# Patient Record
Sex: FEMALE | Race: WHITE | Employment: OTHER | URBAN - METROPOLITAN AREA
[De-identification: names, ages, dates, MRNs, and addresses within clinical notes are randomized per-mention and may not be internally consistent; named-entity substitution may affect disease eponyms.]

---

## 2017-01-26 ENCOUNTER — OFFICE VISIT (OUTPATIENT)
Dept: INTERNAL MEDICINE CLINIC | Age: 57
End: 2017-01-26

## 2017-01-26 VITALS
OXYGEN SATURATION: 98 % | BODY MASS INDEX: 27.49 KG/M2 | WEIGHT: 161 LBS | SYSTOLIC BLOOD PRESSURE: 142 MMHG | HEART RATE: 81 BPM | HEIGHT: 64 IN | DIASTOLIC BLOOD PRESSURE: 100 MMHG

## 2017-01-26 DIAGNOSIS — Z11.59 NEED FOR HEPATITIS C SCREENING TEST: ICD-10-CM

## 2017-01-26 DIAGNOSIS — R22.31 AXILLARY MASS, RIGHT: Primary | ICD-10-CM

## 2017-01-26 LAB
ANION GAP SERPL CALCULATED.3IONS-SCNC: 14 MMOL/L (ref 3–16)
BUN BLDV-MCNC: 18 MG/DL (ref 7–20)
CALCIUM SERPL-MCNC: 10 MG/DL (ref 8.3–10.6)
CHLORIDE BLD-SCNC: 100 MMOL/L (ref 99–110)
CO2: 27 MMOL/L (ref 21–32)
CREAT SERPL-MCNC: 0.6 MG/DL (ref 0.6–1.1)
GFR AFRICAN AMERICAN: >60
GFR NON-AFRICAN AMERICAN: >60
GLUCOSE BLD-MCNC: 83 MG/DL (ref 70–99)
POTASSIUM SERPL-SCNC: 4.2 MMOL/L (ref 3.5–5.1)
SODIUM BLD-SCNC: 141 MMOL/L (ref 136–145)

## 2017-01-26 PROCEDURE — 99213 OFFICE O/P EST LOW 20 MIN: CPT | Performed by: INTERNAL MEDICINE

## 2017-01-26 RX ORDER — ETODOLAC 400 MG/1
400 TABLET, FILM COATED ORAL 2 TIMES DAILY
COMMUNITY
End: 2017-06-01

## 2017-01-26 ASSESSMENT — ENCOUNTER SYMPTOMS
EYES NEGATIVE: 1
RESPIRATORY NEGATIVE: 1

## 2017-01-27 LAB
HEPATITIS C ANTIBODY INTERPRETATION: NORMAL
HIV-1 AND HIV-2 ANTIBODIES: NORMAL

## 2017-02-13 ENCOUNTER — TELEPHONE (OUTPATIENT)
Dept: INTERNAL MEDICINE CLINIC | Age: 57
End: 2017-02-13

## 2017-02-13 ENCOUNTER — OFFICE VISIT (OUTPATIENT)
Dept: INTERNAL MEDICINE CLINIC | Age: 57
End: 2017-02-13

## 2017-02-13 VITALS
BODY MASS INDEX: 27.14 KG/M2 | OXYGEN SATURATION: 96 % | TEMPERATURE: 97.9 F | SYSTOLIC BLOOD PRESSURE: 130 MMHG | HEART RATE: 98 BPM | DIASTOLIC BLOOD PRESSURE: 94 MMHG | HEIGHT: 64 IN | WEIGHT: 159 LBS

## 2017-02-13 DIAGNOSIS — J40 BRONCHITIS: Primary | ICD-10-CM

## 2017-02-13 DIAGNOSIS — R07.9 CHEST PAIN, UNSPECIFIED TYPE: ICD-10-CM

## 2017-02-13 DIAGNOSIS — H92.01 EAR PAIN, RIGHT: ICD-10-CM

## 2017-02-13 PROCEDURE — 93000 ELECTROCARDIOGRAM COMPLETE: CPT | Performed by: INTERNAL MEDICINE

## 2017-02-13 PROCEDURE — 99214 OFFICE O/P EST MOD 30 MIN: CPT | Performed by: INTERNAL MEDICINE

## 2017-02-13 RX ORDER — METHYLPREDNISOLONE 4 MG/1
TABLET ORAL
Qty: 1 KIT | Refills: 0 | Status: SHIPPED | OUTPATIENT
Start: 2017-02-13 | End: 2017-03-27

## 2017-02-13 RX ORDER — CEFUROXIME AXETIL 250 MG/1
250 TABLET ORAL 2 TIMES DAILY
Qty: 20 TABLET | Refills: 0 | Status: SHIPPED | OUTPATIENT
Start: 2017-02-13 | End: 2017-02-23

## 2017-02-13 ASSESSMENT — ENCOUNTER SYMPTOMS
EYES NEGATIVE: 1
CHEST TIGHTNESS: 1

## 2017-02-14 ENCOUNTER — OFFICE VISIT (OUTPATIENT)
Dept: SURGERY | Age: 57
End: 2017-02-14

## 2017-02-14 VITALS
DIASTOLIC BLOOD PRESSURE: 88 MMHG | BODY MASS INDEX: 27.14 KG/M2 | SYSTOLIC BLOOD PRESSURE: 136 MMHG | WEIGHT: 159 LBS | HEIGHT: 64 IN

## 2017-02-14 DIAGNOSIS — R22.31 AXILLARY MASS, RIGHT: Primary | ICD-10-CM

## 2017-02-14 PROCEDURE — 99243 OFF/OP CNSLTJ NEW/EST LOW 30: CPT | Performed by: SURGERY

## 2017-02-14 ASSESSMENT — ENCOUNTER SYMPTOMS
ABDOMINAL DISTENTION: 0
BLOOD IN STOOL: 0
BACK PAIN: 0
ABDOMINAL PAIN: 0
NAUSEA: 0
RECTAL PAIN: 0
CONSTIPATION: 0
COUGH: 0
DIARRHEA: 0
SHORTNESS OF BREATH: 0
COLOR CHANGE: 0
VOMITING: 0
TROUBLE SWALLOWING: 0

## 2017-02-27 ENCOUNTER — HOSPITAL ENCOUNTER (OUTPATIENT)
Dept: ULTRASOUND IMAGING | Age: 57
Discharge: HOME | End: 2017-02-27
Attending: SURGERY | Admitting: SURGERY

## 2017-02-27 DIAGNOSIS — R22.31 AXILLARY MASS, RIGHT: ICD-10-CM

## 2017-02-27 DIAGNOSIS — R22.31 LOCALIZED SWELLING, MASS AND LUMP, RIGHT UPPER LIMB: ICD-10-CM

## 2017-03-27 ENCOUNTER — OFFICE VISIT (OUTPATIENT)
Dept: INTERNAL MEDICINE CLINIC | Age: 57
End: 2017-03-27

## 2017-03-27 VITALS
HEART RATE: 74 BPM | DIASTOLIC BLOOD PRESSURE: 82 MMHG | BODY MASS INDEX: 27.31 KG/M2 | WEIGHT: 160 LBS | HEIGHT: 64 IN | SYSTOLIC BLOOD PRESSURE: 132 MMHG | OXYGEN SATURATION: 98 %

## 2017-03-27 DIAGNOSIS — I10 ESSENTIAL HYPERTENSION: ICD-10-CM

## 2017-03-27 DIAGNOSIS — R07.82 INTERCOSTAL PAIN: ICD-10-CM

## 2017-03-27 PROCEDURE — 99213 OFFICE O/P EST LOW 20 MIN: CPT | Performed by: INTERNAL MEDICINE

## 2017-03-27 ASSESSMENT — ENCOUNTER SYMPTOMS
RESPIRATORY NEGATIVE: 1
EYES NEGATIVE: 1

## 2017-03-28 ENCOUNTER — HOSPITAL ENCOUNTER (OUTPATIENT)
Dept: OTHER | Age: 57
Discharge: HOME | End: 2017-03-28
Attending: INTERNAL MEDICINE | Admitting: INTERNAL MEDICINE

## 2017-03-28 DIAGNOSIS — R07.82 INTERCOSTAL PAIN: ICD-10-CM

## 2017-05-01 ENCOUNTER — OFFICE VISIT (OUTPATIENT)
Dept: ENT CLINIC | Age: 57
End: 2017-05-01

## 2017-05-01 VITALS
DIASTOLIC BLOOD PRESSURE: 86 MMHG | HEART RATE: 72 BPM | HEIGHT: 64 IN | WEIGHT: 160 LBS | SYSTOLIC BLOOD PRESSURE: 135 MMHG | BODY MASS INDEX: 27.31 KG/M2

## 2017-05-01 DIAGNOSIS — H91.93 HEARING LOSS OF BOTH EARS: Primary | ICD-10-CM

## 2017-05-01 DIAGNOSIS — H66.11 CHRONIC TUBOTYMPANIC SUPPURATIVE OTITIS MEDIA OF RIGHT EAR: ICD-10-CM

## 2017-05-01 PROCEDURE — 99203 OFFICE O/P NEW LOW 30 MIN: CPT | Performed by: OTOLARYNGOLOGY

## 2017-05-03 DIAGNOSIS — Q89.8 STICKLER SYNDROME: ICD-10-CM

## 2017-05-03 DIAGNOSIS — Z79.899 ENCOUNTER FOR LONG-TERM (CURRENT) USE OF MEDICATIONS: ICD-10-CM

## 2017-05-04 RX ORDER — ETODOLAC 400 MG/1
TABLET, FILM COATED ORAL
Qty: 180 TABLET | Refills: 0 | Status: SHIPPED | OUTPATIENT
Start: 2017-05-04 | End: 2017-06-01

## 2017-05-11 ENCOUNTER — OFFICE VISIT (OUTPATIENT)
Dept: ENT CLINIC | Age: 57
End: 2017-05-11

## 2017-05-11 DIAGNOSIS — H90.8 MIXED HEARING LOSS: Primary | ICD-10-CM

## 2017-05-11 PROCEDURE — 92557 COMPREHENSIVE HEARING TEST: CPT | Performed by: AUDIOLOGIST

## 2017-05-11 PROCEDURE — 92550 TYMPANOMETRY & REFLEX THRESH: CPT | Performed by: AUDIOLOGIST

## 2017-05-23 ENCOUNTER — EMPLOYEE WELLNESS (OUTPATIENT)
Dept: OTHER | Age: 57
End: 2017-05-23

## 2017-05-23 LAB
CHOLESTEROL, TOTAL: 216 MG/DL (ref 0–199)
GLUCOSE BLD-MCNC: 71 MG/DL (ref 70–99)
HDLC SERPL-MCNC: 57 MG/DL (ref 40–60)
LDL CHOLESTEROL CALCULATED: 142 MG/DL
TRIGL SERPL-MCNC: 84 MG/DL (ref 0–150)

## 2017-06-01 ENCOUNTER — OFFICE VISIT (OUTPATIENT)
Dept: RHEUMATOLOGY | Age: 57
End: 2017-06-01

## 2017-06-01 VITALS
DIASTOLIC BLOOD PRESSURE: 84 MMHG | HEIGHT: 64 IN | BODY MASS INDEX: 27.55 KG/M2 | WEIGHT: 161.4 LBS | SYSTOLIC BLOOD PRESSURE: 138 MMHG | HEART RATE: 84 BPM

## 2017-06-01 DIAGNOSIS — Z79.1 ENCOUNTER FOR LONG-TERM (CURRENT) USE OF NON-STEROIDAL ANTI-INFLAMMATORIES: ICD-10-CM

## 2017-06-01 DIAGNOSIS — M15.9 PRIMARY OSTEOARTHRITIS INVOLVING MULTIPLE JOINTS: Primary | ICD-10-CM

## 2017-06-01 LAB
ALBUMIN SERPL-MCNC: 4.3 G/DL (ref 3.4–5)
ALP BLD-CCNC: 61 U/L (ref 40–129)
ALT SERPL-CCNC: 12 U/L (ref 10–40)
AST SERPL-CCNC: 14 U/L (ref 15–37)
BASOPHILS ABSOLUTE: 0 K/UL (ref 0–0.2)
BASOPHILS RELATIVE PERCENT: 0.5 %
BILIRUB SERPL-MCNC: 0.4 MG/DL (ref 0–1)
BILIRUBIN DIRECT: <0.2 MG/DL (ref 0–0.3)
BILIRUBIN, INDIRECT: ABNORMAL MG/DL (ref 0–1)
CREAT SERPL-MCNC: 0.7 MG/DL (ref 0.6–1.1)
EOSINOPHILS ABSOLUTE: 0.1 K/UL (ref 0–0.6)
EOSINOPHILS RELATIVE PERCENT: 1 %
GFR AFRICAN AMERICAN: >60
GFR NON-AFRICAN AMERICAN: >60
HCT VFR BLD CALC: 39.8 % (ref 36–48)
HEMOGLOBIN: 13.5 G/DL (ref 12–16)
LYMPHOCYTES ABSOLUTE: 1.7 K/UL (ref 1–5.1)
LYMPHOCYTES RELATIVE PERCENT: 28 %
MCH RBC QN AUTO: 30.6 PG (ref 26–34)
MCHC RBC AUTO-ENTMCNC: 33.8 G/DL (ref 31–36)
MCV RBC AUTO: 90.4 FL (ref 80–100)
MONOCYTES ABSOLUTE: 0.4 K/UL (ref 0–1.3)
MONOCYTES RELATIVE PERCENT: 6.3 %
NEUTROPHILS ABSOLUTE: 4 K/UL (ref 1.7–7.7)
NEUTROPHILS RELATIVE PERCENT: 64.2 %
PDW BLD-RTO: 13.1 % (ref 12.4–15.4)
PLATELET # BLD: 230 K/UL (ref 135–450)
PMV BLD AUTO: 10 FL (ref 5–10.5)
RBC # BLD: 4.4 M/UL (ref 4–5.2)
TOTAL PROTEIN: 6.6 G/DL (ref 6.4–8.2)
WBC # BLD: 6.2 K/UL (ref 4–11)

## 2017-06-01 PROCEDURE — 99213 OFFICE O/P EST LOW 20 MIN: CPT | Performed by: INTERNAL MEDICINE

## 2017-06-01 RX ORDER — MELOXICAM 15 MG/1
15 TABLET ORAL DAILY
Qty: 90 TABLET | Refills: 1 | Status: SHIPPED | OUTPATIENT
Start: 2017-06-01 | End: 2017-11-13 | Stop reason: ALTCHOICE

## 2017-06-01 RX ORDER — MELOXICAM 15 MG/1
15 TABLET ORAL DAILY
COMMUNITY
End: 2017-06-01 | Stop reason: SDUPTHER

## 2017-06-12 ENCOUNTER — TELEPHONE (OUTPATIENT)
Dept: INTERNAL MEDICINE CLINIC | Age: 57
End: 2017-06-12

## 2017-06-12 ENCOUNTER — OFFICE VISIT (OUTPATIENT)
Dept: ENT CLINIC | Age: 57
End: 2017-06-12

## 2017-06-12 VITALS — HEART RATE: 71 BPM | DIASTOLIC BLOOD PRESSURE: 88 MMHG | SYSTOLIC BLOOD PRESSURE: 123 MMHG

## 2017-06-12 DIAGNOSIS — H90.8 MIXED HEARING LOSS: Primary | ICD-10-CM

## 2017-06-12 DIAGNOSIS — H93.13 SUBJECTIVE TINNITUS OF BOTH EARS: ICD-10-CM

## 2017-06-12 DIAGNOSIS — H90.A22 SENSORINEURAL HEARING LOSS OF LEFT EAR WITH RESTRICTED HEARING OF RIGHT EAR: ICD-10-CM

## 2017-06-12 PROCEDURE — 99214 OFFICE O/P EST MOD 30 MIN: CPT | Performed by: OTOLARYNGOLOGY

## 2017-06-29 ENCOUNTER — TELEPHONE (OUTPATIENT)
Dept: ORTHOPEDIC SURGERY | Age: 57
End: 2017-06-29

## 2017-06-29 ENCOUNTER — OFFICE VISIT (OUTPATIENT)
Dept: ORTHOPEDIC SURGERY | Age: 57
End: 2017-06-29

## 2017-06-29 VITALS
BODY MASS INDEX: 28.53 KG/M2 | HEART RATE: 62 BPM | WEIGHT: 161 LBS | DIASTOLIC BLOOD PRESSURE: 83 MMHG | SYSTOLIC BLOOD PRESSURE: 135 MMHG | HEIGHT: 63 IN | TEMPERATURE: 98 F

## 2017-06-29 DIAGNOSIS — Z96.652 HISTORY OF TOTAL LEFT KNEE REPLACEMENT: Primary | ICD-10-CM

## 2017-06-29 DIAGNOSIS — M25.552 PAIN OF LEFT HIP JOINT: ICD-10-CM

## 2017-06-29 PROCEDURE — 99213 OFFICE O/P EST LOW 20 MIN: CPT | Performed by: ORTHOPAEDIC SURGERY

## 2017-06-29 PROCEDURE — 73560 X-RAY EXAM OF KNEE 1 OR 2: CPT | Performed by: ORTHOPAEDIC SURGERY

## 2017-09-06 DIAGNOSIS — Q89.8 STICKLER SYNDROME: ICD-10-CM

## 2017-09-06 DIAGNOSIS — Z79.899 ENCOUNTER FOR LONG-TERM (CURRENT) USE OF MEDICATIONS: ICD-10-CM

## 2017-09-06 RX ORDER — ETODOLAC 400 MG/1
TABLET, FILM COATED ORAL
Qty: 180 TABLET | Refills: 0 | Status: SHIPPED | OUTPATIENT
Start: 2017-09-06 | End: 2017-11-27

## 2017-10-04 ENCOUNTER — TELEPHONE (OUTPATIENT)
Dept: INTERNAL MEDICINE CLINIC | Age: 57
End: 2017-10-04

## 2017-10-04 NOTE — TELEPHONE ENCOUNTER
Patient going on a cruise and asking for script for motion sickness patches    [id number] [facility], [address] [place] [address] 28 - F [phone]

## 2017-10-05 RX ORDER — SCOLOPAMINE TRANSDERMAL SYSTEM 1 MG/1
1 PATCH, EXTENDED RELEASE TRANSDERMAL
Qty: 10 PATCH | Refills: 1 | Status: SHIPPED | OUTPATIENT
Start: 2017-10-05 | End: 2017-11-13 | Stop reason: ALTCHOICE

## 2017-11-13 ENCOUNTER — OFFICE VISIT (OUTPATIENT)
Dept: INTERNAL MEDICINE CLINIC | Age: 57
End: 2017-11-13

## 2017-11-13 ENCOUNTER — TELEPHONE (OUTPATIENT)
Dept: INTERNAL MEDICINE CLINIC | Age: 57
End: 2017-11-13

## 2017-11-13 VITALS
HEART RATE: 81 BPM | OXYGEN SATURATION: 98 % | BODY MASS INDEX: 28.17 KG/M2 | SYSTOLIC BLOOD PRESSURE: 134 MMHG | DIASTOLIC BLOOD PRESSURE: 98 MMHG | HEIGHT: 63 IN | WEIGHT: 159 LBS

## 2017-11-13 DIAGNOSIS — I10 ESSENTIAL HYPERTENSION: ICD-10-CM

## 2017-11-13 DIAGNOSIS — K52.9 GASTROENTERITIS: Primary | ICD-10-CM

## 2017-11-13 LAB
A/G RATIO: 1.6 (ref 1.1–2.2)
ALBUMIN SERPL-MCNC: 4.2 G/DL (ref 3.4–5)
ALP BLD-CCNC: 69 U/L (ref 40–129)
ALT SERPL-CCNC: 18 U/L (ref 10–40)
ANION GAP SERPL CALCULATED.3IONS-SCNC: 13 MMOL/L (ref 3–16)
AST SERPL-CCNC: 20 U/L (ref 15–37)
BASOPHILS ABSOLUTE: 0 K/UL (ref 0–0.2)
BASOPHILS RELATIVE PERCENT: 0.3 %
BILIRUB SERPL-MCNC: 0.4 MG/DL (ref 0–1)
BUN BLDV-MCNC: 16 MG/DL (ref 7–20)
CALCIUM SERPL-MCNC: 9.5 MG/DL (ref 8.3–10.6)
CHLORIDE BLD-SCNC: 99 MMOL/L (ref 99–110)
CO2: 28 MMOL/L (ref 21–32)
CREAT SERPL-MCNC: 0.5 MG/DL (ref 0.6–1.1)
EOSINOPHILS ABSOLUTE: 0.1 K/UL (ref 0–0.6)
EOSINOPHILS RELATIVE PERCENT: 1.2 %
GFR AFRICAN AMERICAN: >60
GFR NON-AFRICAN AMERICAN: >60
GLOBULIN: 2.6 G/DL
GLUCOSE BLD-MCNC: 97 MG/DL (ref 70–99)
HCT VFR BLD CALC: 42.4 % (ref 36–48)
HEMOGLOBIN: 14.7 G/DL (ref 12–16)
LYMPHOCYTES ABSOLUTE: 1 K/UL (ref 1–5.1)
LYMPHOCYTES RELATIVE PERCENT: 19.9 %
MCH RBC QN AUTO: 30.8 PG (ref 26–34)
MCHC RBC AUTO-ENTMCNC: 34.6 G/DL (ref 31–36)
MCV RBC AUTO: 89.1 FL (ref 80–100)
MONOCYTES ABSOLUTE: 0.5 K/UL (ref 0–1.3)
MONOCYTES RELATIVE PERCENT: 10.5 %
NEUTROPHILS ABSOLUTE: 3.4 K/UL (ref 1.7–7.7)
NEUTROPHILS RELATIVE PERCENT: 68.1 %
PDW BLD-RTO: 12.5 % (ref 12.4–15.4)
PLATELET # BLD: 237 K/UL (ref 135–450)
PMV BLD AUTO: 9.3 FL (ref 5–10.5)
POTASSIUM SERPL-SCNC: 3.9 MMOL/L (ref 3.5–5.1)
RBC # BLD: 4.76 M/UL (ref 4–5.2)
SODIUM BLD-SCNC: 140 MMOL/L (ref 136–145)
TOTAL PROTEIN: 6.8 G/DL (ref 6.4–8.2)
WBC # BLD: 5 K/UL (ref 4–11)

## 2017-11-13 PROCEDURE — 99213 OFFICE O/P EST LOW 20 MIN: CPT | Performed by: INTERNAL MEDICINE

## 2017-11-13 RX ORDER — PROMETHAZINE HYDROCHLORIDE 25 MG/1
25 TABLET ORAL EVERY 8 HOURS PRN
Qty: 12 TABLET | Refills: 0 | Status: SHIPPED | OUTPATIENT
Start: 2017-11-13 | End: 2017-11-20

## 2017-11-13 ASSESSMENT — ENCOUNTER SYMPTOMS
EYES NEGATIVE: 1
VOMITING: 1
DIARRHEA: 1
RESPIRATORY NEGATIVE: 1

## 2017-11-13 NOTE — PROGRESS NOTES
Subjective:      Patient ID: Miller Neville is a 56 y.o. female. Was on 8 day   Cruise     Got back 11/5    [facility]    She started with vomiting/diarrhea    11/11/17     had it  3 days prior     Had flu shots    No fevers  Chills    Some mild HA    No resp   Symptoms    She feels better than yesterday    Nausea and diarrhea    Keeps pedialite down    Few myalgia    No blood    Diarrhea and vomiting stooped yesterday  HPI    Review of Systems   Constitutional: Negative. HENT: Negative. Eyes: Negative. Respiratory: Negative. Cardiovascular: Negative. Gastrointestinal: Positive for diarrhea and vomiting. Endocrine: Negative. Genitourinary: Negative. Objective:   Physical Exam   Constitutional: She appears well-developed and well-nourished. HENT:   Head: Normocephalic and atraumatic. Eyes: Conjunctivae and EOM are normal.   Cardiovascular: Regular rhythm. Pulmonary/Chest: Breath sounds normal. No respiratory distress. She has no wheezes. She has no rales. Abdominal: Soft. She exhibits no distension and no mass. There is no tenderness. There is no rebound. Assessment:      Nancy Chaudhry was seen today for nausea & vomiting. Diagnoses and all orders for this visit:    Gastroenteritis  -     promethazine (PHENERGAN) 25 MG tablet; Take 1 tablet by mouth every 8 hours as needed for Nausea  -     CBC Auto Differential  -     Comprehensive Metabolic Panel         Started 1 week after ,  has it  She is better  No fever or blood    Says she    Stopped diarrhea and vomiting    Essential hypertension     stable          Plan: Raymundo Carmichael

## 2017-11-15 ENCOUNTER — TELEPHONE (OUTPATIENT)
Dept: INTERNAL MEDICINE CLINIC | Age: 57
End: 2017-11-15

## 2017-11-15 DIAGNOSIS — R19.7 DIARRHEA, UNSPECIFIED TYPE: Primary | ICD-10-CM

## 2017-11-15 RX ORDER — METRONIDAZOLE 250 MG/1
250 TABLET ORAL 4 TIMES DAILY
Qty: 20 TABLET | Refills: 0 | Status: SHIPPED | OUTPATIENT
Start: 2017-11-15 | End: 2017-11-20

## 2017-11-15 NOTE — TELEPHONE ENCOUNTER
Patient states she saw Dr Devin Schneider for diarrhea and vomiting. She said she still has diarrhea, cramping, abdominal pain, she said her stomach pain is so bad Roni Stamford can feel it running through her colon\". She thinks she has giardia and wants flagyl. She has bloating, watery stool, cramping. She was in [place] and came home 11/5 and was swimming with dolphins.  This has been ongoing since Saturday     Pharmacy:  [address] [place] [address] 180 - P [phone] - F [phone]      AS#[phone]

## 2017-11-27 ENCOUNTER — OFFICE VISIT (OUTPATIENT)
Dept: RHEUMATOLOGY | Age: 57
End: 2017-11-27

## 2017-11-27 VITALS
DIASTOLIC BLOOD PRESSURE: 84 MMHG | WEIGHT: 158.25 LBS | BODY MASS INDEX: 28.04 KG/M2 | SYSTOLIC BLOOD PRESSURE: 128 MMHG | HEIGHT: 63 IN | HEART RATE: 72 BPM

## 2017-11-27 DIAGNOSIS — Q89.8 STICKLER SYNDROME: ICD-10-CM

## 2017-11-27 DIAGNOSIS — Z79.899 ENCOUNTER FOR LONG-TERM (CURRENT) USE OF MEDICATIONS: ICD-10-CM

## 2017-11-27 PROCEDURE — 99213 OFFICE O/P EST LOW 20 MIN: CPT | Performed by: INTERNAL MEDICINE

## 2017-11-27 RX ORDER — ASCORBIC ACID 500 MG
500 TABLET ORAL DAILY
COMMUNITY
End: 2020-10-12

## 2017-11-27 RX ORDER — ETODOLAC 500 MG/1
500 TABLET, FILM COATED ORAL 2 TIMES DAILY
Qty: 60 TABLET | Refills: 2 | Status: SHIPPED | OUTPATIENT
Start: 2017-11-27 | End: 2018-05-31 | Stop reason: SDUPTHER

## 2017-11-27 RX ORDER — ETODOLAC 400 MG/1
TABLET, FILM COATED ORAL
Qty: 180 TABLET | Refills: 0 | Status: CANCELLED | OUTPATIENT
Start: 2017-11-27

## 2017-11-27 RX ORDER — GLUCOSAMINE SULFATE DIPOT CHLR 1000 MG
500 TABLET ORAL
COMMUNITY
End: 2019-01-22

## 2017-11-27 RX ORDER — ETODOLAC 500 MG/1
500 TABLET, FILM COATED ORAL 2 TIMES DAILY
COMMUNITY
End: 2017-11-27 | Stop reason: SDUPTHER

## 2017-11-27 RX ORDER — GLUCOSAM/CHON-MSM1/C/MANG/BOSW 750-644 MG
TABLET ORAL
COMMUNITY

## 2017-11-27 NOTE — PROGRESS NOTES
Subjective:      Patient ID: Panda Kennedy is a 56 y.o. female. HPI   the patient returns for follow-up of osteoarthritis. Switching the Mobic was of no benefit she went back to Lodine. She started on multiple supplements which seems to be helping except she still has pain in her hands and wrists. Review of Systems denies redness or warmth; no definite symptoms carpal tunnel syndrome  Prior to Visit Medications    Medication Sig Taking? Authorizing Provider   Elizabeth Zingiber officinalis, (ELIZABETH ROOT PO) Take by mouth Yes Historical Provider, MD   Misc Natural Products (OSTEO BI-FLEX ADV TRIPLE ST) TABS Take by mouth Yes Historical Provider, MD   Methylsulfonylmethane (MSM) 1000 MG TABS Take 500 mg by mouth Yes Historical Provider, MD   vitamin C (ASCORBIC ACID) 500 MG tablet Take 500 mg by mouth daily Yes Historical Provider, MD   etodolac (LODINE) 500 MG tablet Take 1 tablet by mouth 2 times daily Yes Peace Sidhu MD   Cholecalciferol (VITAMIN D) 2000 UNITS CAPS capsule Take  by mouth. Historical Provider, MD           Objective:   Physical Exam /84   Pulse 72   Ht 5' 3\" (1.6 m)   Wt 158 lb 4 oz (71.8 kg)   LMP 12/01/2011   BMI 28.03 kg/m²   Decreased external rotation left greater than right hip. Prominent osteoarthritic changes PIPs and DIPs. No definite swelling wrists. Tinel sign negative bilaterally    Assessment:      Osteoarthritis      Plan:      The patient will increase the dose of Lodine 500 mg twice daily. Blood work recently obtained by her primary care physician was reviewed. The patient will call the office in one month's time to let me know how she's doing and I'll see her back in 6 weeks' time to monitor for renal insufficiency.   She was advised to use acetaminophen up thousand milligrams 3 times daily for extra analgesia

## 2018-03-20 VITALS — BODY MASS INDEX: 27.12 KG/M2 | WEIGHT: 158 LBS

## 2018-05-22 ENCOUNTER — EMPLOYEE WELLNESS (OUTPATIENT)
Dept: OTHER | Age: 58
End: 2018-05-22

## 2018-05-22 LAB
CHOLESTEROL, TOTAL: 238 MG/DL (ref 0–199)
GLUCOSE BLD-MCNC: 80 MG/DL (ref 70–99)
HDLC SERPL-MCNC: 58 MG/DL (ref 40–60)
LDL CHOLESTEROL CALCULATED: 153 MG/DL
TRIGL SERPL-MCNC: 134 MG/DL (ref 0–150)

## 2018-05-25 ENCOUNTER — OFFICE VISIT (OUTPATIENT)
Dept: ORTHOPEDIC SURGERY | Age: 58
End: 2018-05-25

## 2018-05-25 VITALS
BODY MASS INDEX: 28.17 KG/M2 | DIASTOLIC BLOOD PRESSURE: 91 MMHG | WEIGHT: 165 LBS | RESPIRATION RATE: 16 BRPM | HEIGHT: 64 IN | SYSTOLIC BLOOD PRESSURE: 151 MMHG | HEART RATE: 64 BPM

## 2018-05-25 DIAGNOSIS — M25.531 RIGHT WRIST PAIN: Primary | ICD-10-CM

## 2018-05-25 DIAGNOSIS — S63.91XA SPRAIN OF RIGHT HAND, INITIAL ENCOUNTER: ICD-10-CM

## 2018-05-25 PROCEDURE — L3908 WHO COCK-UP NONMOLDE PRE OTS: HCPCS | Performed by: ORTHOPAEDIC SURGERY

## 2018-05-25 PROCEDURE — 99213 OFFICE O/P EST LOW 20 MIN: CPT | Performed by: ORTHOPAEDIC SURGERY

## 2018-05-25 RX ORDER — NAPROXEN 500 MG/1
500 TABLET ORAL 2 TIMES DAILY WITH MEALS
Qty: 60 TABLET | Refills: 0 | Status: CANCELLED | OUTPATIENT
Start: 2018-05-25

## 2018-05-29 VITALS — BODY MASS INDEX: 28.87 KG/M2 | WEIGHT: 163 LBS

## 2018-05-31 ENCOUNTER — OFFICE VISIT (OUTPATIENT)
Dept: RHEUMATOLOGY | Age: 58
End: 2018-05-31

## 2018-05-31 ENCOUNTER — HOSPITAL ENCOUNTER (OUTPATIENT)
Dept: WOMENS IMAGING | Age: 58
Discharge: HOME | End: 2018-05-31
Attending: OBSTETRICS & GYNECOLOGY | Admitting: OBSTETRICS & GYNECOLOGY

## 2018-05-31 VITALS
DIASTOLIC BLOOD PRESSURE: 78 MMHG | SYSTOLIC BLOOD PRESSURE: 118 MMHG | HEART RATE: 68 BPM | BODY MASS INDEX: 27.84 KG/M2 | WEIGHT: 162.2 LBS

## 2018-05-31 DIAGNOSIS — Z12.31 VISIT FOR SCREENING MAMMOGRAM: ICD-10-CM

## 2018-05-31 DIAGNOSIS — Q89.8 STICKLER SYNDROME: Primary | ICD-10-CM

## 2018-05-31 DIAGNOSIS — Z79.1 ENCOUNTER FOR LONG-TERM (CURRENT) USE OF NON-STEROIDAL ANTI-INFLAMMATORIES: ICD-10-CM

## 2018-05-31 LAB
ALBUMIN SERPL-MCNC: 4.9 G/DL (ref 3.4–5)
ALP BLD-CCNC: 92 U/L (ref 40–129)
ALT SERPL-CCNC: 16 U/L (ref 10–40)
AST SERPL-CCNC: 19 U/L (ref 15–37)
BASOPHILS ABSOLUTE: 0 K/UL (ref 0–0.2)
BASOPHILS RELATIVE PERCENT: 0.6 %
BILIRUB SERPL-MCNC: 0.6 MG/DL (ref 0–1)
BILIRUBIN DIRECT: <0.2 MG/DL (ref 0–0.3)
BILIRUBIN, INDIRECT: NORMAL MG/DL (ref 0–1)
CREAT SERPL-MCNC: 0.6 MG/DL (ref 0.6–1.1)
EOSINOPHILS ABSOLUTE: 0.1 K/UL (ref 0–0.6)
EOSINOPHILS RELATIVE PERCENT: 1.9 %
GFR AFRICAN AMERICAN: >60
GFR NON-AFRICAN AMERICAN: >60
HCT VFR BLD CALC: 43.1 % (ref 36–48)
HEMOGLOBIN: 15.1 G/DL (ref 12–16)
LYMPHOCYTES ABSOLUTE: 2.1 K/UL (ref 1–5.1)
LYMPHOCYTES RELATIVE PERCENT: 30.5 %
MCH RBC QN AUTO: 30.8 PG (ref 26–34)
MCHC RBC AUTO-ENTMCNC: 35.1 G/DL (ref 31–36)
MCV RBC AUTO: 87.6 FL (ref 80–100)
MONOCYTES ABSOLUTE: 0.4 K/UL (ref 0–1.3)
MONOCYTES RELATIVE PERCENT: 5.2 %
NEUTROPHILS ABSOLUTE: 4.2 K/UL (ref 1.7–7.7)
NEUTROPHILS RELATIVE PERCENT: 61.8 %
PDW BLD-RTO: 12.9 % (ref 12.4–15.4)
PLATELET # BLD: 254 K/UL (ref 135–450)
PMV BLD AUTO: 9.7 FL (ref 5–10.5)
RBC # BLD: 4.92 M/UL (ref 4–5.2)
TOTAL PROTEIN: 7.5 G/DL (ref 6.4–8.2)
WBC # BLD: 6.8 K/UL (ref 4–11)

## 2018-05-31 PROCEDURE — 99213 OFFICE O/P EST LOW 20 MIN: CPT | Performed by: INTERNAL MEDICINE

## 2018-05-31 RX ORDER — ETODOLAC 500 MG/1
500 TABLET, FILM COATED ORAL 2 TIMES DAILY
Qty: 180 TABLET | Refills: 1 | Status: SHIPPED | OUTPATIENT
Start: 2018-05-31 | End: 2018-11-19 | Stop reason: SDUPTHER

## 2018-06-04 ENCOUNTER — OFFICE VISIT (OUTPATIENT)
Dept: ORTHOPEDIC SURGERY | Age: 58
End: 2018-06-04

## 2018-06-04 VITALS — RESPIRATION RATE: 16 BRPM | WEIGHT: 154 LBS | BODY MASS INDEX: 25.66 KG/M2 | HEIGHT: 65 IN

## 2018-06-04 DIAGNOSIS — M65.331 TRIGGER MIDDLE FINGER OF RIGHT HAND: Primary | ICD-10-CM

## 2018-06-04 PROCEDURE — 99243 OFF/OP CNSLTJ NEW/EST LOW 30: CPT | Performed by: ORTHOPAEDIC SURGERY

## 2018-06-05 ENCOUNTER — TELEPHONE (OUTPATIENT)
Dept: ORTHOPEDIC SURGERY | Age: 58
End: 2018-06-05

## 2018-06-06 ENCOUNTER — TELEPHONE (OUTPATIENT)
Dept: ORTHOPEDIC SURGERY | Age: 58
End: 2018-06-06

## 2018-06-08 ENCOUNTER — TELEPHONE (OUTPATIENT)
Dept: ORTHOPEDIC SURGERY | Age: 58
End: 2018-06-08

## 2018-06-12 ENCOUNTER — TELEPHONE (OUTPATIENT)
Dept: ORTHOPEDIC SURGERY | Age: 58
End: 2018-06-12

## 2018-08-13 ENCOUNTER — TELEPHONE (OUTPATIENT)
Dept: ENT CLINIC | Age: 58
End: 2018-08-13

## 2018-08-13 ENCOUNTER — OFFICE VISIT (OUTPATIENT)
Dept: ENT CLINIC | Age: 58
End: 2018-08-13

## 2018-08-13 VITALS
HEART RATE: 69 BPM | HEIGHT: 65 IN | SYSTOLIC BLOOD PRESSURE: 120 MMHG | WEIGHT: 163 LBS | BODY MASS INDEX: 27.16 KG/M2 | DIASTOLIC BLOOD PRESSURE: 83 MMHG

## 2018-08-13 DIAGNOSIS — H92.01 OTALGIA, RIGHT EAR: Primary | ICD-10-CM

## 2018-08-13 PROCEDURE — 92504 EAR MICROSCOPY EXAMINATION: CPT | Performed by: OTOLARYNGOLOGY

## 2018-08-13 PROCEDURE — 99214 OFFICE O/P EST MOD 30 MIN: CPT | Performed by: OTOLARYNGOLOGY

## 2018-08-13 NOTE — PATIENT INSTRUCTIONS
1. You may proceed with your right ear hearing aid fitting tomorrow. 2. You may use acetaminophen (eg. Tylenol) or Ibuprofen (eg. Advil) OTC as needed for pain.

## 2018-09-13 ENCOUNTER — HOSPITAL ENCOUNTER (OUTPATIENT)
Dept: ULTRASOUND IMAGING | Age: 58
Discharge: HOME | End: 2018-09-13
Attending: INTERNAL MEDICINE | Admitting: INTERNAL MEDICINE

## 2018-09-13 ENCOUNTER — OFFICE VISIT (OUTPATIENT)
Dept: INTERNAL MEDICINE CLINIC | Age: 58
End: 2018-09-13

## 2018-09-13 VITALS
SYSTOLIC BLOOD PRESSURE: 122 MMHG | HEART RATE: 82 BPM | DIASTOLIC BLOOD PRESSURE: 88 MMHG | OXYGEN SATURATION: 97 % | BODY MASS INDEX: 26.7 KG/M2 | TEMPERATURE: 98.1 F | WEIGHT: 158 LBS

## 2018-09-13 DIAGNOSIS — R07.81 PLEURITIC PAIN: ICD-10-CM

## 2018-09-13 DIAGNOSIS — R07.82 INTERCOSTAL PAIN: ICD-10-CM

## 2018-09-13 DIAGNOSIS — I10 ESSENTIAL HYPERTENSION: ICD-10-CM

## 2018-09-13 DIAGNOSIS — R10.12 LEFT UPPER QUADRANT PAIN: ICD-10-CM

## 2018-09-13 DIAGNOSIS — R10.12 LUQ ABDOMINAL PAIN: Primary | ICD-10-CM

## 2018-09-13 DIAGNOSIS — R10.12 LUQ ABDOMINAL PAIN: ICD-10-CM

## 2018-09-13 PROCEDURE — 99214 OFFICE O/P EST MOD 30 MIN: CPT | Performed by: INTERNAL MEDICINE

## 2018-09-13 ASSESSMENT — ENCOUNTER SYMPTOMS
SORE THROAT: 0
ABDOMINAL PAIN: 0
DIARRHEA: 0
SHORTNESS OF BREATH: 0
TROUBLE SWALLOWING: 0
CHEST TIGHTNESS: 1
VOMITING: 0
WHEEZING: 0
NAUSEA: 0

## 2018-09-13 NOTE — PROGRESS NOTES
Department of Internal Medicine  Clinic Note    Date: 9/13/2018                                               Subjective/Objective:     Chief Complaint   Patient presents with    Chest Pain     c/o pain in lt lateral lower rib area for 2-3 days. Hurts to take a deep breath. No trauma     HPI: Pt presents today for evaluation of acute onset left lower rib pain. She states the pain is worse when she takes a deep breath. She has not taken anything to alleviate the pain. She has had a similar experience a couple months ago which resolved on its own. At that time she did not have the pleuritic pain that is present today. Neg GERD, Indigestion   Last BM was yesterday, soft, negative blood. Patient Active Problem List    Diagnosis Date Noted    Otalgia, right ear 08/13/2018    Trigger middle finger of right hand 06/04/2018    Mixed hearing loss 06/12/2017    Sensorineural hearing loss (SNHL) of left ear with restricted hearing of right ear 06/12/2017    Subjective tinnitus of both ears 06/12/2017    Hearing loss of both ears 05/01/2017    Chronic tubotympanic suppurative otitis media of right ear 05/01/2017    Intercostal pain 03/27/2017    Essential hypertension 03/27/2017    Stickler syndrome 12/11/2014    Joint pain 12/11/2014     Social History:   TOBACCO:   reports that she has never smoked. She has never used smokeless tobacco.     ETOH:   reports that she does not drink alcohol.     Past Medical History:   Diagnosis Date    Arthritis     lt knee/hands    Right knee pain     Stickler syndrome        Past Surgical History:   Procedure Laterality Date    CLEFT PALATE REPAIR  1964 and 1971    DILATION AND CURETTAGE OF UTERUS  12/9/11    HYSTEROSCOPY,NOVASURE ABLATION    MANDIBLE SURGERY  1990    OVARY REMOVAL      and cyst    TOTAL KNEE ARTHROPLASTY  3/25/11    Left     Office Visit on 05/31/2018   Component Date Value Ref Range Status    WBC 05/31/2018 6.8  4.0 - 11.0 K/uL Final    RBC 05/31/2018 4.92  4.00 - 5.20 M/uL Final    Hemoglobin 05/31/2018 15.1  12.0 - 16.0 g/dL Final    Hematocrit 05/31/2018 43.1  36.0 - 48.0 % Final    MCV 05/31/2018 87.6  80.0 - 100.0 fL Final    MCH 05/31/2018 30.8  26.0 - 34.0 pg Final    MCHC 05/31/2018 35.1  31.0 - 36.0 g/dL Final    RDW 05/31/2018 12.9  12.4 - 15.4 % Final    Platelets 05/57/4777 254  135 - 450 K/uL Final    MPV 05/31/2018 9.7  5.0 - 10.5 fL Final    Neutrophils % 05/31/2018 61.8  % Final    Lymphocytes % 05/31/2018 30.5  % Final    Monocytes % 05/31/2018 5.2  % Final    Eosinophils % 05/31/2018 1.9  % Final    Basophils % 05/31/2018 0.6  % Final    Neutrophils # 05/31/2018 4.2  1.7 - 7.7 K/uL Final    Lymphocytes # 05/31/2018 2.1  1.0 - 5.1 K/uL Final    Monocytes # 05/31/2018 0.4  0.0 - 1.3 K/uL Final    Eosinophils # 05/31/2018 0.1  0.0 - 0.6 K/uL Final    Basophils # 05/31/2018 0.0  0.0 - 0.2 K/uL Final    Total Protein 05/31/2018 7.5  6.4 - 8.2 g/dL Final    Alb 05/31/2018 4.9  3.4 - 5.0 g/dL Final    Alkaline Phosphatase 05/31/2018 92  40 - 129 U/L Final    ALT 05/31/2018 16  10 - 40 U/L Final    AST 05/31/2018 19  15 - 37 U/L Final    Total Bilirubin 05/31/2018 0.6  0.0 - 1.0 mg/dL Final    Bilirubin, Direct 05/31/2018 <0.2  0.0 - 0.3 mg/dL Final    Bilirubin, Indirect 05/31/2018 see below  0.0 - 1.0 mg/dL Final    Comment: Indirect Bilirubin cannot be calculated since Total Bilirubin  and/or Direct Bilirubin is below measurable range.  CREATININE 05/31/2018 0.6  0.6 - 1.1 mg/dL Final    GFR Non- 05/31/2018 >60  >60 Final    Comment: >60 mL/min/1.73m2 EGFR, calc. for ages 25 and older using the  MDRD formula (not corrected for weight), is valid for stable  renal function.  GFR  05/31/2018 >60  >60 Final    Comment: Chronic Kidney Disease: less than 60 ml/min/1.73 sq.m. Kidney Failure: less than 15 ml/min/1.73 sq.m.   Results valid for patients 18 years and unintentional computerized transcription errors may be present.

## 2018-09-14 ENCOUNTER — TELEPHONE (OUTPATIENT)
Dept: INTERNAL MEDICINE CLINIC | Age: 58
End: 2018-09-14

## 2018-09-14 NOTE — TELEPHONE ENCOUNTER
Please contact the patient and inform her that her abdominal ultrasound was within normal limits and was not concerning for pathology. Thank you.

## 2018-09-14 NOTE — TELEPHONE ENCOUNTER
Patient states that she is still having severe pain on her left side. It hurts to lay, breath, sleep. What do you recommend? I did advise the patient that Dr. Caity Martin would be back on Monday and we would wait to see what he recommend. I did advise her to go to the ER if the pain worsens.

## 2018-10-15 ENCOUNTER — OFFICE VISIT (OUTPATIENT)
Dept: ORTHOPEDIC SURGERY | Age: 58
End: 2018-10-15
Payer: COMMERCIAL

## 2018-10-15 VITALS
RESPIRATION RATE: 16 BRPM | WEIGHT: 158 LBS | SYSTOLIC BLOOD PRESSURE: 114 MMHG | TEMPERATURE: 98.8 F | BODY MASS INDEX: 26.33 KG/M2 | DIASTOLIC BLOOD PRESSURE: 80 MMHG | HEIGHT: 65 IN | HEART RATE: 76 BPM

## 2018-10-15 DIAGNOSIS — Z96.652 S/P TOTAL KNEE ARTHROPLASTY, LEFT: Primary | ICD-10-CM

## 2018-10-15 PROCEDURE — 99212 OFFICE O/P EST SF 10 MIN: CPT | Performed by: PHYSICIAN ASSISTANT

## 2018-10-15 NOTE — PROGRESS NOTES
Objective:     She is alert, oriented x 3, pleasant, well nourished, developed and in no acute distress. /80   Pulse 76   Temp 98.8 °F (37.1 °C) (Tympanic)   Resp 16   Ht 5' 4.5\" (1.638 m)   Wt 158 lb (71.7 kg)   LMP 12/01/2011   BMI 26.70 kg/m²      KNEE EXAM:  Examination of the left knee shows: There is  no obvious deformity. There is not erythema. There is minimal to no soft tissue swelling. There is no significant joint effusion. AROM-  Extension 0                       Flexion  125  There is no pain associated with ROM testing. Medial joint line is not tender to palpation. Lateral joint line is not tender to palpation. There is not crepitus along the joint line with ROM testing. There is no significant instability with varus or valgus stress testing. Anterior Drawer test is negative for instability. Extensor Mechanism is  intact. X Rays: performed in the office today:   AP, Lateral and Sunrise of the left Knee: There is a left total knee arthroplasty present. The alignment is satisfactory. There are no signs of significant failure or loosening. Assessment:       ICD-10-CM    1. S/P total knee arthroplasty, left 3.25.11 Z96.652 XR KNEE LEFT (3 VIEWS)        Plan:     The natural history of the patient's diagnosis as well as the treatment options were discussed in full and questions were answered. Risks and benefits of the treatment options also reviewed in detail. Continue with a HEP-  A home exercise program was re-instructed today including ROM exercises and strengthening exercises. The patient verbalized understanding of these exercises as well as the importance of the exercise program to promote return of normal function. If pain intensifies or other problems arise you are to notify the office. Prophylactic antibiotics for any surgical or dental procedures. This is recommended for lifetime.     Follow up yearly with x ray and clinical evaluations. Call or return to clinic prn if these symptoms worsen or fail to improve as anticipated.

## 2018-11-19 ENCOUNTER — OFFICE VISIT (OUTPATIENT)
Dept: RHEUMATOLOGY | Age: 58
End: 2018-11-19
Payer: COMMERCIAL

## 2018-11-19 VITALS
HEART RATE: 72 BPM | HEIGHT: 65 IN | SYSTOLIC BLOOD PRESSURE: 132 MMHG | WEIGHT: 160.25 LBS | DIASTOLIC BLOOD PRESSURE: 84 MMHG | BODY MASS INDEX: 26.7 KG/M2

## 2018-11-19 DIAGNOSIS — Z79.1 ENCOUNTER FOR LONG-TERM (CURRENT) USE OF NSAIDS: ICD-10-CM

## 2018-11-19 DIAGNOSIS — Q89.8 STICKLER SYNDROME: Primary | ICD-10-CM

## 2018-11-19 LAB
ALBUMIN SERPL-MCNC: 4.4 G/DL (ref 3.4–5)
ALP BLD-CCNC: 85 U/L (ref 40–129)
ALT SERPL-CCNC: 15 U/L (ref 10–40)
AST SERPL-CCNC: 17 U/L (ref 15–37)
BASOPHILS ABSOLUTE: 0 K/UL (ref 0–0.2)
BASOPHILS RELATIVE PERCENT: 0.5 %
BILIRUB SERPL-MCNC: 0.6 MG/DL (ref 0–1)
BILIRUBIN DIRECT: <0.2 MG/DL (ref 0–0.3)
BILIRUBIN, INDIRECT: NORMAL MG/DL (ref 0–1)
CREAT SERPL-MCNC: 0.7 MG/DL (ref 0.6–1.1)
EOSINOPHILS ABSOLUTE: 0.1 K/UL (ref 0–0.6)
EOSINOPHILS RELATIVE PERCENT: 1.6 %
GFR AFRICAN AMERICAN: >60
GFR NON-AFRICAN AMERICAN: >60
HCT VFR BLD CALC: 40.7 % (ref 36–48)
HEMOGLOBIN: 13.9 G/DL (ref 12–16)
LYMPHOCYTES ABSOLUTE: 2 K/UL (ref 1–5.1)
LYMPHOCYTES RELATIVE PERCENT: 32.2 %
MCH RBC QN AUTO: 30.3 PG (ref 26–34)
MCHC RBC AUTO-ENTMCNC: 34.2 G/DL (ref 31–36)
MCV RBC AUTO: 88.7 FL (ref 80–100)
MONOCYTES ABSOLUTE: 0.4 K/UL (ref 0–1.3)
MONOCYTES RELATIVE PERCENT: 6 %
NEUTROPHILS ABSOLUTE: 3.7 K/UL (ref 1.7–7.7)
NEUTROPHILS RELATIVE PERCENT: 59.7 %
PDW BLD-RTO: 12.7 % (ref 12.4–15.4)
PLATELET # BLD: 248 K/UL (ref 135–450)
PMV BLD AUTO: 9.5 FL (ref 5–10.5)
RBC # BLD: 4.59 M/UL (ref 4–5.2)
TOTAL PROTEIN: 6.9 G/DL (ref 6.4–8.2)
WBC # BLD: 6.1 K/UL (ref 4–11)

## 2018-11-19 PROCEDURE — 99213 OFFICE O/P EST LOW 20 MIN: CPT | Performed by: INTERNAL MEDICINE

## 2018-11-19 RX ORDER — ETODOLAC 500 MG/1
500 TABLET, FILM COATED ORAL 2 TIMES DAILY
Qty: 180 TABLET | Refills: 1 | Status: SHIPPED | OUTPATIENT
Start: 2018-11-19 | End: 2019-04-22 | Stop reason: SDUPTHER

## 2019-04-22 ENCOUNTER — OFFICE VISIT (OUTPATIENT)
Dept: RHEUMATOLOGY | Age: 59
End: 2019-04-22
Payer: COMMERCIAL

## 2019-04-22 VITALS
BODY MASS INDEX: 26.01 KG/M2 | HEIGHT: 65 IN | SYSTOLIC BLOOD PRESSURE: 122 MMHG | DIASTOLIC BLOOD PRESSURE: 82 MMHG | HEART RATE: 64 BPM | WEIGHT: 156.13 LBS

## 2019-04-22 DIAGNOSIS — Z79.1 ENCOUNTER FOR LONG-TERM (CURRENT) USE OF NON-STEROIDAL ANTI-INFLAMMATORIES: ICD-10-CM

## 2019-04-22 DIAGNOSIS — Q89.8 STICKLER SYNDROME: Primary | ICD-10-CM

## 2019-04-22 LAB
ALBUMIN SERPL-MCNC: 4.6 G/DL (ref 3.4–5)
ALP BLD-CCNC: 91 U/L (ref 40–129)
ALT SERPL-CCNC: 15 U/L (ref 10–40)
AST SERPL-CCNC: 19 U/L (ref 15–37)
BASOPHILS ABSOLUTE: 0 K/UL (ref 0–0.2)
BASOPHILS RELATIVE PERCENT: 0.7 %
BILIRUB SERPL-MCNC: 0.5 MG/DL (ref 0–1)
BILIRUBIN DIRECT: <0.2 MG/DL (ref 0–0.3)
BILIRUBIN, INDIRECT: NORMAL MG/DL (ref 0–1)
CREAT SERPL-MCNC: 0.6 MG/DL (ref 0.6–1.1)
EOSINOPHILS ABSOLUTE: 0.1 K/UL (ref 0–0.6)
EOSINOPHILS RELATIVE PERCENT: 1.6 %
GFR AFRICAN AMERICAN: >60
GFR NON-AFRICAN AMERICAN: >60
HCT VFR BLD CALC: 42.6 % (ref 36–48)
HEMOGLOBIN: 14.6 G/DL (ref 12–16)
LYMPHOCYTES ABSOLUTE: 1.8 K/UL (ref 1–5.1)
LYMPHOCYTES RELATIVE PERCENT: 28.3 %
MCH RBC QN AUTO: 31 PG (ref 26–34)
MCHC RBC AUTO-ENTMCNC: 34.4 G/DL (ref 31–36)
MCV RBC AUTO: 90.3 FL (ref 80–100)
MONOCYTES ABSOLUTE: 0.3 K/UL (ref 0–1.3)
MONOCYTES RELATIVE PERCENT: 5.1 %
NEUTROPHILS ABSOLUTE: 4.1 K/UL (ref 1.7–7.7)
NEUTROPHILS RELATIVE PERCENT: 64.3 %
PDW BLD-RTO: 13.2 % (ref 12.4–15.4)
PLATELET # BLD: 245 K/UL (ref 135–450)
PMV BLD AUTO: 9.5 FL (ref 5–10.5)
RBC # BLD: 4.72 M/UL (ref 4–5.2)
TOTAL PROTEIN: 7.3 G/DL (ref 6.4–8.2)
WBC # BLD: 6.3 K/UL (ref 4–11)

## 2019-04-22 PROCEDURE — 99213 OFFICE O/P EST LOW 20 MIN: CPT | Performed by: INTERNAL MEDICINE

## 2019-04-22 RX ORDER — ETODOLAC 500 MG/1
500 TABLET, FILM COATED ORAL 2 TIMES DAILY
Qty: 180 TABLET | Refills: 1 | Status: SHIPPED | OUTPATIENT
Start: 2019-04-22 | End: 2020-03-02

## 2019-04-22 NOTE — PROGRESS NOTES
SUBJECTIVE:    Patient ID: Megan Langford is a 66 y.o. female. Patient returns for follow-up of Stickler syndrome. She's currently on 500 mg twice daily and has intermittent flares of her disease suggestive of fibromyalgia. She has difficulty with her ADLs. She wakens twice a night. Review of Systems:    Respiratory: denies cough, denies shortness of breath  Gastrointestinal: denies abdominal pain, denies nausea  Musculoskeletal:                  no       Morning stiffness  Ears, nose, mouth: denies mucosal sores  Skin: denies rash, denies alopecia    Prior to Visit Medications    Medication Sig Taking? Authorizing Provider   Misc Natural Products (OSTEO BI-FLEX ADV TRIPLE ST) TABS Take by mouth Yes Historical Provider, MD   vitamin C (ASCORBIC ACID) 500 MG tablet Take 500 mg by mouth daily Yes Historical Provider, MD   etodolac (LODINE) 500 MG tablet Take 1 tablet by mouth 2 times daily Yes Geo Sellers MD        OBJECTIVE:  /82   Pulse 64   Ht 5' 4.5\" (1.638 m)   Wt 156 lb 2 oz (70.8 kg)   LMP 12/01/2011   BMI 26.38 kg/m²      Physical Exam:    General: the patient demonstrated normal gait and posture without evidence of overt muscle wasting. Hygiene appears normal    Ears, mouth, nose, throat: no mucosal sores      Respiratory: assessment of the patient's respiratory effort showed no evidence of abnormal respiration and no use of accessory muscles. Diaphragmatic movement is normal.  Percussion of the patient's chest showed no evidence of dullness flatness or hyperresonance. Auscultation of the patient's lungs reveal normal breath sounds in all lung fields. There is no evidence of abnormal sounds such as rales or wheezes. There is no evidence of friction rub. Cardiovascular: palpation of the patient's chest revealed no abnormal thrill. The point of maximal impulse showed no displacement.   Auscultation of the heart revealed no evidence of murmur gallop or abnormal heart sound.    Musculoskeletal: Osteoarthritic changes DIPs and PIPs tender points occiput and trapezius areas tender points lateral epicondyles tender points trochanteric region  Skin: no rashes    ASSESSMENT: stickler syndrome. Possible fibromyalgia        PLAN:   Blood work will be obtained today to monitor for adverse drug reactions. Laboratory studies from last visit were reviewed. She was encouraged to use Tylenol up to 3000 mg a day along with her Lodine. She was given literature on fibromyalgia to review she was instructed to call the office in 3 weeks' time if agreeable at that point we'll start Cymbalta. I'll see her back in 5 months time.

## 2019-04-25 ENCOUNTER — TELEPHONE (OUTPATIENT)
Dept: INTERNAL MEDICINE CLINIC | Age: 59
End: 2019-04-25

## 2019-04-25 NOTE — TELEPHONE ENCOUNTER
Pt call and states that she want to start on low dose Cymbalta. He request to send to [facility] mail order pharmacy. She request call and and left message.

## 2019-04-26 RX ORDER — DULOXETIN HYDROCHLORIDE 20 MG/1
20 CAPSULE, DELAYED RELEASE ORAL DAILY
Qty: 90 CAPSULE | Refills: 0 | Status: SHIPPED | OUTPATIENT
Start: 2019-04-26 | End: 2019-07-14 | Stop reason: SDUPTHER

## 2019-05-14 ENCOUNTER — EMPLOYEE WELLNESS (OUTPATIENT)
Dept: OTHER | Age: 59
End: 2019-05-14

## 2019-05-14 LAB
CHOLESTEROL, TOTAL: 227 MG/DL (ref 0–199)
GLUCOSE BLD-MCNC: 79 MG/DL (ref 70–99)
HDLC SERPL-MCNC: 51 MG/DL (ref 40–60)
LDL CHOLESTEROL CALCULATED: 138 MG/DL
TRIGL SERPL-MCNC: 191 MG/DL (ref 0–150)

## 2019-06-03 VITALS — WEIGHT: 144 LBS | BODY MASS INDEX: 24.34 KG/M2

## 2019-06-05 ENCOUNTER — TELEPHONE (OUTPATIENT)
Dept: ORTHOPEDIC SURGERY | Age: 59
End: 2019-06-05

## 2019-06-05 NOTE — TELEPHONE ENCOUNTER
Called and spoke with patient. No fever or chills. Offered appointment tomorrow with Dr. Gagandeep Garcia, not sure she can get off of work. She is to call us if anything changes or worsens.

## 2019-06-06 ENCOUNTER — OFFICE VISIT (OUTPATIENT)
Dept: ORTHOPEDIC SURGERY | Age: 59
End: 2019-06-06
Payer: COMMERCIAL

## 2019-06-06 VITALS
BODY MASS INDEX: 23.99 KG/M2 | TEMPERATURE: 98.9 F | WEIGHT: 144 LBS | HEIGHT: 65 IN | SYSTOLIC BLOOD PRESSURE: 142 MMHG | DIASTOLIC BLOOD PRESSURE: 100 MMHG | HEART RATE: 73 BPM

## 2019-06-06 DIAGNOSIS — M25.462 EFFUSION OF LEFT KNEE: ICD-10-CM

## 2019-06-06 DIAGNOSIS — Z96.652 S/P TOTAL KNEE ARTHROPLASTY, LEFT: Primary | ICD-10-CM

## 2019-06-06 PROCEDURE — 20610 DRAIN/INJ JOINT/BURSA W/O US: CPT | Performed by: PHYSICIAN ASSISTANT

## 2019-06-06 PROCEDURE — 99213 OFFICE O/P EST LOW 20 MIN: CPT | Performed by: PHYSICIAN ASSISTANT

## 2019-06-06 NOTE — PROGRESS NOTES
Subjective:      Patient ID: Rodríguez Almanzar is a 66 y.o.  female. Chief Complaint   Patient presents with    Knee Pain     Left knee        HPI: She is here for follow-up on her left knee. She has a history of a left knee arthroplasty performed by Dr. Johnson Early on 3/25/2011. She had been doing very well with her knee. She developed left knee pain and swelling after a recent dental cleaning. She did not take prophylactic antibiotics at the time of her cleaning. She has developed left knee pain as well as swelling of the left knee joint. Pain is 5/10. Review of Systems:   Negative for fever or chills. Negative for numbness or tingling in the lower extremity.     Past Medical History:   Diagnosis Date    Arthritis     lt knee/hands    Right knee pain     Stickler syndrome        Family History   Problem Relation Age of Onset    Arthritis Mother     Heart Disease Father     Hypertension Father     Cancer Maternal Grandmother        Past Surgical History:   Procedure Laterality Date    CLEFT PALATE REPAIR  1964 and 1971    DILATION AND CURETTAGE OF UTERUS  12/9/11    HYSTEROSCOPY,NOVASURE ABLATION    MANDIBLE SURGERY  1990    OVARY REMOVAL      and cyst    TOTAL KNEE ARTHROPLASTY  3/25/11    Left       Social History     Occupational History    Occupation: Pharmacy, IV technician   Tobacco Use    Smoking status: Never Smoker    Smokeless tobacco: Never Used   Substance and Sexual Activity    Alcohol use: No    Drug use: No    Sexual activity: Yes     Partners: Male       Current Outpatient Medications   Medication Sig Dispense Refill    DULoxetine (CYMBALTA) 20 MG extended release capsule Take 1 capsule by mouth daily 90 capsule 0    etodolac (LODINE) 500 MG tablet Take 1 tablet by mouth 2 times daily 180 tablet 1    Misc Natural Products (OSTEO BI-FLEX ADV TRIPLE ST) TABS Take by mouth      vitamin C (ASCORBIC ACID) 500 MG tablet Take 500 mg by mouth daily       No current facility-administered medications for this visit. Objective:   She is alert, oriented x 3, pleasant, well nourished, developed and in no acute distress. BP (!) 142/100   Pulse 73   Temp 98.9 °F (37.2 °C) (Temporal)   Ht 5' 4.5\" (1.638 m)   Wt 144 lb (65.3 kg)   LMP 12/01/2011   BMI 24.34 kg/m²      Examination of the left knee: The alignment of the knee is neutral.   There is not erythema. There no soft tissue swelling. There is mild to moderate effusion. ROM-  Extension 0          -   Flexion  125   There mild pain associated with ROM testing. Medial joint line is not tender to palpation. Lateral joint line is not tender to palpation. Retro patellar crepitus is not present. Patellar Compression testing does not produce pain. Extensor Mechanism is  intact. Examination of the lower extremities are intact with sensation to light touch. Motor testing  5/5 in all major motor groups of the lower extremities. Gait is normal heel to toe. Gait is antalgic. Negative Javed's Sign. SLR negative. Examination of the lower extremities shows intact perfusion to all extremities. No cyanosis. Digits are warm to touch, capillary refill is less than 2 seconds. There is no edema noted. Examination of the skin over both lower extremities reveals: The skin to be intact without lacerations or abrasions. No significant erythema. No rashes or skin lesions. X Rays: performed in the office today:   AP Standing, Lateral and Sunrise  Left Knee: There is a left cemented total knee arthroplasty present. The alignment is satisfactory. There are no signs of failure or loosening. Jada moderate effusion noted in the suprapatellar pouch. Diagnosis:        ICD-10-CM    1. S/P total knee arthroplasty, left 3.25.11 Z96.652 XR KNEE LEFT (3 VIEWS)   2. Effusion of left knee M25.462         Assessment/ Plan:      Clinically stable left knee arthroplasty.   Increased left knee pain and left knee joint effusion status post recent dental cleansing without prophylactic antibiotics. The natural history of the patient's diagnosis as well as the treatment options were discussed in full and questions were answered. Risks and benefits of the treatment options also reviewed in detail. The patient's history and clinical findings where discussed with Dr Jamil Casiano. Dr Jamil Casiano did have face to face time with the patient. All questions where answered. Discussed the need for aspiration of the knee joint for pain relief as well as diagnostic purposes. All risks (infection,neuro-vascular injury, pain) and benefits were discussed as well as questions answered. Verbal consent was obtained. Knee Aspiration                                                    PROCEDURE NOTE:     Pre op Diagnosis:  left knee effusion. Post op Diagnosis: Same  With her permission, the left knee was prepped in standard sterile fashion with  Alcohol and betadine over the lateral superior aspect of the knee. Using an 18 gauge needle through the anterior lateral supra patellar approach 6 cc of cloudy fluid was aspirated. The patient tolerated this well without difficulty. A band-aid and a compression dressing was applied. She was advised to ice the knee for 15-20 minutes to relieve any related pain. The synovial fluid from her left knee will be sent off for Synovasure Testing. Rest, Ice, Compression and Elevation  OTC NSAID'S discussed to be taken in appropriate  therapeutic doses. Activity restriction/ Modification discussed. The patient was advised that NSAID-type medications have two very important potential side effects: gastrointestinal irritation including hemorrhage and renal injuries. She was asked to take the medication with food and to stop if she experiences any GI upset. I asked her to call for vomiting, abdominal pain or black/bloody stools.  He should have renal function testing per his medical provider periodically. The patient expresses understanding of these issues and questions were answered. She was provided a note to be off work for the next 3-4 days. Follow Up: 1 week. Call or return to clinic prn if these symptoms worsen or fail to improve as anticipated.

## 2019-06-11 ENCOUNTER — TELEPHONE (OUTPATIENT)
Dept: ORTHOPEDIC SURGERY | Age: 59
End: 2019-06-11

## 2019-06-11 NOTE — TELEPHONE ENCOUNTER
Pt is calling to see if we got the results from her knee.  He had drain it  Please leave a message on home phone

## 2019-07-15 RX ORDER — DULOXETIN HYDROCHLORIDE 20 MG/1
CAPSULE, DELAYED RELEASE ORAL
Qty: 90 CAPSULE | Refills: 0 | Status: SHIPPED | OUTPATIENT
Start: 2019-07-15 | End: 2019-10-07 | Stop reason: SDUPTHER

## 2019-09-23 ENCOUNTER — OFFICE VISIT (OUTPATIENT)
Dept: RHEUMATOLOGY | Age: 59
End: 2019-09-23
Payer: COMMERCIAL

## 2019-09-23 VITALS
WEIGHT: 154.2 LBS | HEART RATE: 66 BPM | DIASTOLIC BLOOD PRESSURE: 82 MMHG | BODY MASS INDEX: 26.06 KG/M2 | SYSTOLIC BLOOD PRESSURE: 126 MMHG

## 2019-09-23 DIAGNOSIS — Z79.1 ENCOUNTER FOR LONG-TERM (CURRENT) USE OF NON-STEROIDAL ANTI-INFLAMMATORIES: ICD-10-CM

## 2019-09-23 DIAGNOSIS — Q89.8 STICKLER SYNDROME: Primary | ICD-10-CM

## 2019-09-23 LAB
ALBUMIN SERPL-MCNC: 4.5 G/DL (ref 3.4–5)
ALP BLD-CCNC: 79 U/L (ref 40–129)
ALT SERPL-CCNC: 14 U/L (ref 10–40)
AST SERPL-CCNC: 18 U/L (ref 15–37)
BASOPHILS ABSOLUTE: 0 K/UL (ref 0–0.2)
BASOPHILS RELATIVE PERCENT: 0.8 %
BILIRUB SERPL-MCNC: 0.4 MG/DL (ref 0–1)
BILIRUBIN DIRECT: <0.2 MG/DL (ref 0–0.3)
BILIRUBIN, INDIRECT: NORMAL MG/DL (ref 0–1)
CREAT SERPL-MCNC: 0.7 MG/DL (ref 0.6–1.1)
EOSINOPHILS ABSOLUTE: 0.1 K/UL (ref 0–0.6)
EOSINOPHILS RELATIVE PERCENT: 2.3 %
GFR AFRICAN AMERICAN: >60
GFR NON-AFRICAN AMERICAN: >60
HCT VFR BLD CALC: 39.1 % (ref 36–48)
HEMOGLOBIN: 13.5 G/DL (ref 12–16)
LYMPHOCYTES ABSOLUTE: 1.6 K/UL (ref 1–5.1)
LYMPHOCYTES RELATIVE PERCENT: 29.1 %
MCH RBC QN AUTO: 31.1 PG (ref 26–34)
MCHC RBC AUTO-ENTMCNC: 34.5 G/DL (ref 31–36)
MCV RBC AUTO: 90.3 FL (ref 80–100)
MONOCYTES ABSOLUTE: 0.4 K/UL (ref 0–1.3)
MONOCYTES RELATIVE PERCENT: 6.7 %
NEUTROPHILS ABSOLUTE: 3.3 K/UL (ref 1.7–7.7)
NEUTROPHILS RELATIVE PERCENT: 61.1 %
PDW BLD-RTO: 12.6 % (ref 12.4–15.4)
PLATELET # BLD: 234 K/UL (ref 135–450)
PMV BLD AUTO: 10 FL (ref 5–10.5)
RBC # BLD: 4.33 M/UL (ref 4–5.2)
TOTAL PROTEIN: 6.6 G/DL (ref 6.4–8.2)
WBC # BLD: 5.3 K/UL (ref 4–11)

## 2019-09-23 PROCEDURE — 99213 OFFICE O/P EST LOW 20 MIN: CPT | Performed by: INTERNAL MEDICINE

## 2019-09-23 NOTE — PROGRESS NOTES
Subjective:      Patient ID: Dara Flowers is a 66 y.o. female. HPI  The patient returns for follow-up of Stickler syndrome. She is feeling better since beginning Cymbalta 20 mg daily along with Lodine 500 mg twice a day. Her only new complaint is intermittent numbness of the second third and fourth digits at the tips involving the right hand  Review of Systems  Denies abdominal pain denies nausea intermittent elbow discomfort  Objective:   Physical Exam  /82 (Site: Left Upper Arm, Position: Sitting, Cuff Size: Medium Adult)   Pulse 66   Wt 154 lb 3.2 oz (69.9 kg)   LMP 12/01/2011   BMI 26.06 kg/m²   Alert female in no acute distress prominent osteoarthritic changes DIPs PIPs Tinel sign negative on the right   Assessment:      Stickler syndrome      Plan:      Blood work will be obtained today to monitor for adverse drug reactions. Laboratory studies from last.  I will see patient back in 5 months time.         Denise Dumont MD

## 2019-10-07 RX ORDER — DULOXETIN HYDROCHLORIDE 20 MG/1
CAPSULE, DELAYED RELEASE ORAL
Qty: 90 CAPSULE | Refills: 1 | Status: SHIPPED | OUTPATIENT
Start: 2019-10-07 | End: 2020-03-02 | Stop reason: SDUPTHER

## 2019-10-14 RX ORDER — CEPHALEXIN 500 MG/1
CAPSULE ORAL
Qty: 20 CAPSULE | Refills: 1 | Status: SHIPPED | OUTPATIENT
Start: 2019-10-14 | End: 2020-03-02

## 2019-12-06 ENCOUNTER — HOSPITAL ENCOUNTER (OUTPATIENT)
Dept: WOMENS IMAGING | Age: 59
Discharge: HOME | End: 2019-12-06
Payer: COMMERCIAL

## 2019-12-06 DIAGNOSIS — Z12.31 VISIT FOR SCREENING MAMMOGRAM: ICD-10-CM

## 2019-12-06 PROCEDURE — 77067 SCR MAMMO BI INCL CAD: CPT

## 2019-12-06 PROCEDURE — 77063 BREAST TOMOSYNTHESIS BI: CPT

## 2020-03-02 ENCOUNTER — OFFICE VISIT (OUTPATIENT)
Dept: RHEUMATOLOGY | Age: 60
End: 2020-03-02
Payer: COMMERCIAL

## 2020-03-02 VITALS
DIASTOLIC BLOOD PRESSURE: 80 MMHG | HEIGHT: 65 IN | WEIGHT: 156.4 LBS | BODY MASS INDEX: 26.06 KG/M2 | SYSTOLIC BLOOD PRESSURE: 128 MMHG | HEART RATE: 68 BPM

## 2020-03-02 LAB
ALBUMIN SERPL-MCNC: 4.3 G/DL (ref 3.4–5)
ALP BLD-CCNC: 99 U/L (ref 40–129)
ALT SERPL-CCNC: 14 U/L (ref 10–40)
AST SERPL-CCNC: 17 U/L (ref 15–37)
BASOPHILS ABSOLUTE: 0 K/UL (ref 0–0.2)
BASOPHILS RELATIVE PERCENT: 0.5 %
BILIRUB SERPL-MCNC: 0.5 MG/DL (ref 0–1)
BILIRUBIN DIRECT: <0.2 MG/DL (ref 0–0.3)
BILIRUBIN, INDIRECT: NORMAL MG/DL (ref 0–1)
CREAT SERPL-MCNC: 0.7 MG/DL (ref 0.6–1.1)
EOSINOPHILS ABSOLUTE: 0.1 K/UL (ref 0–0.6)
EOSINOPHILS RELATIVE PERCENT: 1.3 %
GFR AFRICAN AMERICAN: >60
GFR NON-AFRICAN AMERICAN: >60
HCT VFR BLD CALC: 40.4 % (ref 36–48)
HEMOGLOBIN: 14.1 G/DL (ref 12–16)
LYMPHOCYTES ABSOLUTE: 1.8 K/UL (ref 1–5.1)
LYMPHOCYTES RELATIVE PERCENT: 22.8 %
MCH RBC QN AUTO: 30.8 PG (ref 26–34)
MCHC RBC AUTO-ENTMCNC: 34.9 G/DL (ref 31–36)
MCV RBC AUTO: 88.4 FL (ref 80–100)
MONOCYTES ABSOLUTE: 0.4 K/UL (ref 0–1.3)
MONOCYTES RELATIVE PERCENT: 4.9 %
NEUTROPHILS ABSOLUTE: 5.5 K/UL (ref 1.7–7.7)
NEUTROPHILS RELATIVE PERCENT: 70.5 %
PDW BLD-RTO: 12.6 % (ref 12.4–15.4)
PLATELET # BLD: 264 K/UL (ref 135–450)
PMV BLD AUTO: 9 FL (ref 5–10.5)
RBC # BLD: 4.57 M/UL (ref 4–5.2)
TOTAL PROTEIN: 7.1 G/DL (ref 6.4–8.2)
WBC # BLD: 7.7 K/UL (ref 4–11)

## 2020-03-02 PROCEDURE — 99213 OFFICE O/P EST LOW 20 MIN: CPT | Performed by: INTERNAL MEDICINE

## 2020-03-02 RX ORDER — ETODOLAC 400 MG/1
400 TABLET, FILM COATED ORAL 2 TIMES DAILY
Qty: 180 TABLET | Refills: 1 | Status: SHIPPED | OUTPATIENT
Start: 2020-03-02 | End: 2020-10-12 | Stop reason: SDUPTHER

## 2020-03-02 RX ORDER — DULOXETIN HYDROCHLORIDE 20 MG/1
CAPSULE, DELAYED RELEASE ORAL
Qty: 90 CAPSULE | Refills: 1 | Status: SHIPPED | OUTPATIENT
Start: 2020-03-02 | End: 2020-10-12 | Stop reason: SDUPTHER

## 2020-03-02 RX ORDER — ETODOLAC 500 MG/1
500 TABLET, FILM COATED ORAL 2 TIMES DAILY
Qty: 180 TABLET | Refills: 1 | Status: CANCELLED | OUTPATIENT
Start: 2020-03-02

## 2020-03-02 RX ORDER — ETODOLAC 400 MG/1
400 TABLET, FILM COATED ORAL 2 TIMES DAILY
COMMUNITY
End: 2020-03-02 | Stop reason: SDUPTHER

## 2020-03-13 ENCOUNTER — HOSPITAL ENCOUNTER (OUTPATIENT)
Dept: GENERAL RADIOLOGY | Age: 60
Discharge: HOME | End: 2020-03-13
Payer: COMMERCIAL

## 2020-03-13 ENCOUNTER — HOSPITAL ENCOUNTER (OUTPATIENT)
Age: 60
Discharge: HOME | End: 2020-03-13
Payer: COMMERCIAL

## 2020-03-13 PROCEDURE — 71046 X-RAY EXAM CHEST 2 VIEWS: CPT

## 2020-10-12 ENCOUNTER — OFFICE VISIT (OUTPATIENT)
Dept: RHEUMATOLOGY | Age: 60
End: 2020-10-12
Payer: COMMERCIAL

## 2020-10-12 VITALS
HEART RATE: 64 BPM | BODY MASS INDEX: 27.85 KG/M2 | TEMPERATURE: 97.3 F | DIASTOLIC BLOOD PRESSURE: 86 MMHG | SYSTOLIC BLOOD PRESSURE: 132 MMHG | WEIGHT: 164.8 LBS

## 2020-10-12 LAB
ALBUMIN SERPL-MCNC: 4.2 G/DL (ref 3.4–5)
ALP BLD-CCNC: 74 U/L (ref 40–129)
ALT SERPL-CCNC: 13 U/L (ref 10–40)
AST SERPL-CCNC: 18 U/L (ref 15–37)
BASOPHILS ABSOLUTE: 0 K/UL (ref 0–0.2)
BASOPHILS RELATIVE PERCENT: 0.5 %
BILIRUB SERPL-MCNC: 0.3 MG/DL (ref 0–1)
BILIRUBIN DIRECT: <0.2 MG/DL (ref 0–0.3)
BILIRUBIN, INDIRECT: NORMAL MG/DL (ref 0–1)
CREAT SERPL-MCNC: 0.6 MG/DL (ref 0.6–1.1)
EOSINOPHILS ABSOLUTE: 0.1 K/UL (ref 0–0.6)
EOSINOPHILS RELATIVE PERCENT: 1.6 %
GFR AFRICAN AMERICAN: >60
GFR NON-AFRICAN AMERICAN: >60
HCT VFR BLD CALC: 38.7 % (ref 36–48)
HEMOGLOBIN: 13.3 G/DL (ref 12–16)
LYMPHOCYTES ABSOLUTE: 1.9 K/UL (ref 1–5.1)
LYMPHOCYTES RELATIVE PERCENT: 35.4 %
MCH RBC QN AUTO: 30.4 PG (ref 26–34)
MCHC RBC AUTO-ENTMCNC: 34.3 G/DL (ref 31–36)
MCV RBC AUTO: 88.5 FL (ref 80–100)
MONOCYTES ABSOLUTE: 0.4 K/UL (ref 0–1.3)
MONOCYTES RELATIVE PERCENT: 7.3 %
NEUTROPHILS ABSOLUTE: 2.9 K/UL (ref 1.7–7.7)
NEUTROPHILS RELATIVE PERCENT: 55.2 %
PDW BLD-RTO: 12.9 % (ref 12.4–15.4)
PLATELET # BLD: 244 K/UL (ref 135–450)
PMV BLD AUTO: 9 FL (ref 5–10.5)
RBC # BLD: 4.38 M/UL (ref 4–5.2)
TOTAL PROTEIN: 7 G/DL (ref 6.4–8.2)
WBC # BLD: 5.3 K/UL (ref 4–11)

## 2020-10-12 PROCEDURE — 99213 OFFICE O/P EST LOW 20 MIN: CPT | Performed by: INTERNAL MEDICINE

## 2020-10-12 RX ORDER — ETODOLAC 400 MG/1
400 TABLET, FILM COATED ORAL 2 TIMES DAILY
Qty: 180 TABLET | Refills: 1 | Status: SHIPPED | OUTPATIENT
Start: 2020-10-12 | End: 2021-04-09 | Stop reason: SDUPTHER

## 2020-10-12 RX ORDER — DULOXETIN HYDROCHLORIDE 20 MG/1
CAPSULE, DELAYED RELEASE ORAL
Qty: 90 CAPSULE | Refills: 1 | Status: SHIPPED | OUTPATIENT
Start: 2020-10-12 | End: 2021-07-09

## 2020-10-12 RX ORDER — PREDNISONE 1 MG/1
5 TABLET ORAL DAILY
Qty: 14 TABLET | Refills: 0 | Status: SHIPPED | OUTPATIENT
Start: 2020-10-12 | End: 2020-10-26

## 2020-10-12 NOTE — PROGRESS NOTES
Subjective:      Patient ID: Pierre Delcid is a 64 y.o. female. HPI                   the patient returns for follow-up of Stickler syndrome. She is having increasing joint discomfort and stiffness. She had a fall when her right knee gave out and suffered significant bruising. She is currently on Lodine 400 mg twice daily along with Cymbalta 20 mg a day. Review of Systems  Denies abdominal pain denies nausea planes of joint discomfort  Objective:   Physical Exam /86 (Site: Left Upper Arm, Position: Sitting, Cuff Size: Medium Adult)   Pulse 64   Temp 97.3 °F (36.3 °C) (Temporal)   Wt 164 lb 12.8 oz (74.8 kg)   LMP 12/01/2011   BMI 27.85 kg/m²   Alert female no acute distress. Effusion left knee. Swelling left wrist osteoarthritic changes right greater than hand DIPs and PIPs    Assessment:      Stickler syndrome      Plan: An x-ray of the left wrist will be obtained. She was placed on low-dose prednisone 5 mg a day for the next 2 weeks. Blood work was obtained to monitor for adverse drug reactions from the medication. I will see her back in 5 months time. She will contact the office after she has the x-ray to discuss the results.         Urbano Espinal MD

## 2020-10-13 ENCOUNTER — HOSPITAL ENCOUNTER (OUTPATIENT)
Age: 60
Discharge: HOME | End: 2020-10-13
Payer: COMMERCIAL

## 2020-10-13 ENCOUNTER — HOSPITAL ENCOUNTER (OUTPATIENT)
Dept: GENERAL RADIOLOGY | Age: 60
Discharge: HOME | End: 2020-10-13
Payer: COMMERCIAL

## 2020-10-13 PROCEDURE — 73110 X-RAY EXAM OF WRIST: CPT

## 2020-10-15 ENCOUNTER — HOSPITAL ENCOUNTER (OUTPATIENT)
Dept: WOMENS IMAGING | Age: 60
Discharge: HOME | End: 2020-10-15
Payer: COMMERCIAL

## 2020-10-15 PROCEDURE — 77080 DXA BONE DENSITY AXIAL: CPT

## 2020-10-16 ENCOUNTER — OFFICE VISIT (OUTPATIENT)
Dept: ORTHOPEDIC SURGERY | Age: 60
End: 2020-10-16
Payer: COMMERCIAL

## 2020-10-16 VITALS — BODY MASS INDEX: 27.32 KG/M2 | HEIGHT: 65 IN | WEIGHT: 164 LBS | TEMPERATURE: 97.2 F

## 2020-10-16 PROCEDURE — 99243 OFF/OP CNSLTJ NEW/EST LOW 30: CPT | Performed by: ORTHOPAEDIC SURGERY

## 2020-10-16 NOTE — PROGRESS NOTES
CHIEF COMPLAINT: Left wrist  pain/ SLAC wrist     HISTORY:  Ms. Hamilton Cough is 64 y.o.  female left handed presents today for consultation request from Dr Jens Jane MD for evaluation of a left wrist pain which started worsening gradually over many years. She is complaining of left wrist pain. Nothing makes the pain better. And any use even rest makes the pain worse. She states she is unable to lift a gallon of milk as she has no strength. She has increased swelling that is started over the past 2 weeks. The pain is severe in degree and not radiating. No other complaint. She has rheumatoid arthritis and is currently on etodolac and was recently started on prednisone by Dr. Carla Garvin. She has tried a brace for the left wrist but she is unable to wear it at work. She works in [email or web address] working a lot for him fine motor skills. She has seen multiple physicians for this problem in the past Dr. Yancy Reddy and by Dr. Keiry Hill at [facility] [address] She has had a cortisone injections in her wrist with no improvement. She was recently diagnosed with hereditary Stickler syndrome.     Past Medical History:   Diagnosis Date    Arthritis     lt knee/hands    Right knee pain     Stickler syndrome        Past Surgical History:   Procedure Laterality Date    CLEFT PALATE REPAIR  1964 and Trg Ra 63 OF UTERUS  12/9/11    HYSTEROSCOPY,NOVASURE ABLATION    MANDIBLE SURGERY  1990    OVARY REMOVAL      and cyst    TOTAL KNEE ARTHROPLASTY  3/25/11    Left       Social History     Socioeconomic History    Marital status:      Spouse name: Not on file    Number of children: Not on file    Years of education: Not on file    Highest education level: Not on file   Occupational History    Occupation: Pharmacy, IV technician   Social Needs    Financial resource strain: Not on file    Food insecurity     Worry: Not on file     Inability: Not on file   [email or web address] needs Medical: Not on file     Non-medical: Not on file   Tobacco Use    Smoking status: Never Smoker    Smokeless tobacco: Never Used   Substance and Sexual Activity    Alcohol use: No    Drug use: No    Sexual activity: Yes     Partners: Male   Lifestyle    Physical activity     Days per week: Not on file     Minutes per session: Not on file    Stress: Not on file   Relationships    Social connections     Talks on phone: Not on file     Gets together: Not on file     Attends Latter day service: Not on file     Active member of club or organization: Not on file     Attends meetings of clubs or organizations: Not on file     Relationship status: Not on file    Intimate partner violence     Fear of current or ex partner: Not on file     Emotionally abused: Not on file     Physically abused: Not on file     Forced sexual activity: Not on file   Other Topics Concern    Not on file   Social History Narrative    Not on file       Family History   Problem Relation Age of Onset    Arthritis Mother     Heart Disease Father     Hypertension Father     Cancer Maternal Grandmother        Current Outpatient Medications on File Prior to Visit   Medication Sig Dispense Refill    Multiple Vitamins-Minerals (CENTRUM SILVER 50+WOMEN) TABS Take by mouth daily      DULoxetine (CYMBALTA) 20 MG extended release capsule TAKE 1 CAPSULE BY MOUTH ONE TIME A DAY 90 capsule 1    etodolac (LODINE) 400 MG tablet Take 1 tablet by mouth 2 times daily 180 tablet 1    predniSONE (DELTASONE) 5 MG tablet Take 1 tablet by mouth daily for 14 days 14 tablet 0    Misc Natural Products (OSTEO BI-FLEX ADV TRIPLE ST) TABS Take by mouth       No current facility-administered medications on file prior to visit. Pertinent items are noted in HPI  Review of systems reviewed from Patient History Form dated on 10/16/2020 and available in the patient's chart under the Media tab. No change noted.         PHYSICAL EXAMINATION:  Ms. Senait Wheeler is a very pleasant 64 y.o.  female who presents today in no acute distress, awake, alert, and oriented. She is well dressed, nourished and  groomed. Patient with normal affect. Height is  5' 4.5\" (1.638 m), weight is 164 lb (74.4 kg), Body mass index is 27.72 kg/m². Resting respiratory rate is 16. Examination of the gait, showed that the patient walks with no limp . Examination of both upper extremities showing a decreased range of motion of the left hand compare to the other side. There is mild swelling that can be seen. She has intact sensation and good radial pulses, moderate to significant tenderness on deep palpation over the dorsum of the left wrist. She has a good hand  strength. IMAGING: Evieto Gisel were reviewed, taken 10/13/2020, 3 views of the right  wrist, and showed no fracture. There is widening in the scaphoid and lunate with changes noted in the distal radius consistent with SLAC wrist.    IMPRESSION: Left SLAC wrist     PLAN: The xray findings were reviewed with the patient, and that the pain is wrist arthritis secondary to her Stickler syndrome and rheumatoid arthritis. Recommended a wrist brace and to avoid any heavy impact activities. Recommend prednisone taper which she has recently started. We discussed with her that a cortisone injection is an option, but she would like to hold off for now. We will refer her to a hand surgeon. Thank you very much for the kind consultation and allowing me to participate in this patient's care. I will continue to keep you apprised of her progress.         Jelena Milligan MD

## 2020-10-18 PROBLEM — M19.132 SCAPHOLUNATE ADVANCED COLLAPSE OF LEFT WRIST: Status: ACTIVE | Noted: 2020-10-18

## 2020-12-17 ENCOUNTER — OFFICE VISIT (OUTPATIENT)
Dept: ORTHOPEDIC SURGERY | Age: 60
End: 2020-12-17
Payer: COMMERCIAL

## 2020-12-17 VITALS — WEIGHT: 164 LBS | HEIGHT: 65 IN | BODY MASS INDEX: 27.32 KG/M2 | TEMPERATURE: 97.5 F

## 2020-12-17 PROCEDURE — 99213 OFFICE O/P EST LOW 20 MIN: CPT | Performed by: ORTHOPAEDIC SURGERY

## 2020-12-17 NOTE — PROGRESS NOTES
This dictation was done with [employer] dictation and may contain mechanical errors related to translation. Temperature 97.5 °F (36.4 °C), temperature source Infrared, height 5' 4.5\" (1.638 m), weight 164 lb (74.4 kg), last menstrual period 12/01/2011, not currently breastfeeding.

## 2020-12-20 NOTE — PROGRESS NOTES
Misc Natural Products (OSTEO BI-FLEX ADV TRIPLE ST) TABS Take by mouth Yes Historical Provider, MD     Physical examination 53-year-old female oriented x3 temperature is 97.5. Examination of her back shows good range of motion no significant pain tenderness or instability. Motor exam to the right and left lower extremity shows quadriceps hamstrings hip abductors and abductors foot plantar dorsiflexors are intact 4-5 over 5. Sensation and perfusion are intact to both right and left lower extremities. There is no numbness or tingling noted in either right or left lower extremity. No other obvious cutaneous lesions lymphedema or cellulitic processes are seen in either right or left lower extremity. Examination of the left knee shows a well-healed anterior scar she has full extension of 130 degrees of flexion with good overall stability and no signs of deep sepsis. Examination of the right knee shows a mild effusion medial and parapatellar tenderness to palpation with loss of full extension by 5 degrees flexion to 120 degrees. There are no signs of instability or deep sepsis in her right knee. X-rays obtained AP lateral and patellofemoral view of the left knee show status post cemented left total knee arthroplasty. Stable overall orientation and alignment with no obvious signs of loosening or acute failure noted. Good patellofemoral mechanics are seen and no other obvious fractures tumors or dislocations are noted on these x-rays. X-rays obtained AP lateral patellofemoral view of the right knee shows advancing tricompartmental degenerative osteoarthritis. Narrowing of the tibiofemoral joint both on the medial and lateral side is seen. Degenerative patellofemoral osteoarthritis is also seen on these x-rays. No other obvious fractures tumors or dislocations are seen on these x-rays. Impression 57-year-old female stable now 9+ years postop cemented total knee arthroplasty on the left with right degenerative osteoarthritis. Plan we had a 15-minute face-to-face discussion with this patient of which greater than 50% of time was spent on counseling her about further care of not only her knee her 10-year total knee arthroplasty but also further evaluation and treatment of her painful right knee. Patient may eventually need to undergo right knee arthroplasty and we certainly need to keep an eye on her left total knee as it is approaching double digits in years postoperatively. Follow-up on a yearly basis or as needed.

## 2021-02-17 ENCOUNTER — OFFICE VISIT (OUTPATIENT)
Dept: PRIMARY CARE CLINIC | Age: 61
End: 2021-02-17
Payer: COMMERCIAL

## 2021-02-17 DIAGNOSIS — Z20.822 SUSPECTED COVID-19 VIRUS INFECTION: Primary | ICD-10-CM

## 2021-02-17 PROCEDURE — 99211 OFF/OP EST MAY X REQ PHY/QHP: CPT | Performed by: NURSE PRACTITIONER

## 2021-02-17 NOTE — PROGRESS NOTES
Blane Abrams received a viral test for COVID-19. They were educated on isolation and quarantine as appropriate. For any symptoms, they were directed to seek care from their PCP, given contact information to establish with a doctor, directed to an urgent care or the emergency room.

## 2021-02-18 LAB — SARS-COV-2, NAA: DETECTED

## 2021-03-25 ENCOUNTER — OFFICE VISIT (OUTPATIENT)
Dept: RHEUMATOLOGY | Age: 61
End: 2021-03-25
Payer: COMMERCIAL

## 2021-03-25 VITALS
BODY MASS INDEX: 30 KG/M2 | DIASTOLIC BLOOD PRESSURE: 84 MMHG | TEMPERATURE: 96.9 F | SYSTOLIC BLOOD PRESSURE: 120 MMHG | HEART RATE: 72 BPM | HEIGHT: 62 IN

## 2021-03-25 DIAGNOSIS — Z79.1 ENCOUNTER FOR LONG-TERM (CURRENT) USE OF NON-STEROIDAL ANTI-INFLAMMATORIES: ICD-10-CM

## 2021-03-25 DIAGNOSIS — Q89.8 STICKLER SYNDROME: Primary | ICD-10-CM

## 2021-03-25 LAB
ALBUMIN SERPL-MCNC: 4.2 G/DL (ref 3.4–5)
ALP BLD-CCNC: 86 U/L (ref 40–129)
ALT SERPL-CCNC: 13 U/L (ref 10–40)
AST SERPL-CCNC: 16 U/L (ref 15–37)
BASOPHILS ABSOLUTE: 0.1 K/UL (ref 0–0.2)
BASOPHILS RELATIVE PERCENT: 1 %
BILIRUB SERPL-MCNC: 0.4 MG/DL (ref 0–1)
BILIRUBIN DIRECT: <0.2 MG/DL (ref 0–0.3)
BILIRUBIN, INDIRECT: NORMAL MG/DL (ref 0–1)
CREAT SERPL-MCNC: 0.7 MG/DL (ref 0.6–1.1)
EOSINOPHILS ABSOLUTE: 0.1 K/UL (ref 0–0.6)
EOSINOPHILS RELATIVE PERCENT: 1.6 %
GFR AFRICAN AMERICAN: >60
GFR NON-AFRICAN AMERICAN: >60
HCT VFR BLD CALC: 40.8 % (ref 36–48)
HEMOGLOBIN: 14.3 G/DL (ref 12–16)
LYMPHOCYTES ABSOLUTE: 1.7 K/UL (ref 1–5.1)
LYMPHOCYTES RELATIVE PERCENT: 27 %
MCH RBC QN AUTO: 31 PG (ref 26–34)
MCHC RBC AUTO-ENTMCNC: 35 G/DL (ref 31–36)
MCV RBC AUTO: 88.7 FL (ref 80–100)
MONOCYTES ABSOLUTE: 0.4 K/UL (ref 0–1.3)
MONOCYTES RELATIVE PERCENT: 6.9 %
NEUTROPHILS ABSOLUTE: 4 K/UL (ref 1.7–7.7)
NEUTROPHILS RELATIVE PERCENT: 63.5 %
PDW BLD-RTO: 13 % (ref 12.4–15.4)
PLATELET # BLD: 281 K/UL (ref 135–450)
PMV BLD AUTO: 9.2 FL (ref 5–10.5)
RBC # BLD: 4.6 M/UL (ref 4–5.2)
TOTAL PROTEIN: 6.8 G/DL (ref 6.4–8.2)
WBC # BLD: 6.3 K/UL (ref 4–11)

## 2021-03-25 PROCEDURE — 99213 OFFICE O/P EST LOW 20 MIN: CPT | Performed by: INTERNAL MEDICINE

## 2021-03-25 NOTE — PROGRESS NOTES
Subjective: Carina Dyer is a 64 y.o. female   The patient returns for follow-up of Stickler syndrome. She continues on Lodine 400 mg twice daily and Cymbalta 20 mg a day. She is having persistent difficulty with her wrists and elbows as well as her shoulders. Review of Systems:    Denies abdominal pain denies nausea. Increasing difficulty writing    Objective:     /84   Pulse 72   Temp 96.9 °F (36.1 °C)   Ht 5' 2\" (1.575 m)   LMP 12/01/2011   BMI 30.00 kg/m²   Alert female no acute distress. Soft tissue swelling both wrists with decreased range of motion prominent osteoarthritic changes DIPs and PIPs restricted motion at the elbows    Assessment:    Stickler syndrome      Plan:    We discussed a variety of treatment modalities. I have directed her to look for age to daily living and to start using resting wrist splints on both hands. She will use Tylenol up to 3000 mg a day and she will try diclofenac gel topically. I will see her back in 6 months time.           Debi Barnes MD, 3/25/2021 11:15 AM

## 2021-03-29 ENCOUNTER — TELEPHONE (OUTPATIENT)
Dept: ORTHOPEDIC SURGERY | Age: 61
End: 2021-03-29

## 2021-03-29 NOTE — TELEPHONE ENCOUNTER
GAVE [facility] ( [facility] ) 10/01/2019 TO PRESENT TO NESS BERMUDEZ TO SCAN IN [facility] TO OOD

## 2021-04-09 ENCOUNTER — TELEPHONE (OUTPATIENT)
Dept: INTERNAL MEDICINE CLINIC | Age: 61
End: 2021-04-09

## 2021-04-09 DIAGNOSIS — Q89.8 STICKLER SYNDROME: ICD-10-CM

## 2021-04-09 DIAGNOSIS — Z79.1 ENCOUNTER FOR LONG-TERM (CURRENT) USE OF NON-STEROIDAL ANTI-INFLAMMATORIES: ICD-10-CM

## 2021-04-09 RX ORDER — ETODOLAC 400 MG/1
400 TABLET, FILM COATED ORAL 2 TIMES DAILY
Qty: 180 TABLET | Refills: 1 | Status: SHIPPED | OUTPATIENT
Start: 2021-04-09 | End: 2022-01-10 | Stop reason: SDUPTHER

## 2021-06-29 LAB
CHOLESTEROL, TOTAL: 240 MG/DL (ref 0–199)
GLUCOSE BLD-MCNC: 78 MG/DL (ref 70–99)
HDLC SERPL-MCNC: 58 MG/DL (ref 40–60)
LDL CHOLESTEROL CALCULATED: 161 MG/DL
TRIGL SERPL-MCNC: 105 MG/DL (ref 0–150)

## 2021-07-02 ENCOUNTER — HOSPITAL ENCOUNTER (OUTPATIENT)
Dept: WOMENS IMAGING | Age: 61
Discharge: HOME | End: 2021-07-02
Payer: COMMERCIAL

## 2021-07-02 DIAGNOSIS — Z12.31 VISIT FOR SCREENING MAMMOGRAM: ICD-10-CM

## 2021-07-02 PROCEDURE — 77063 BREAST TOMOSYNTHESIS BI: CPT

## 2021-07-09 ENCOUNTER — APPOINTMENT (OUTPATIENT)
Dept: ULTRASOUND IMAGING | Age: 61
End: 2021-07-09
Payer: COMMERCIAL

## 2021-07-09 ENCOUNTER — HOSPITAL ENCOUNTER (OUTPATIENT)
Dept: WOMENS IMAGING | Age: 61
Discharge: HOME | End: 2021-07-09
Payer: COMMERCIAL

## 2021-07-09 DIAGNOSIS — Q89.8 STICKLER SYNDROME: ICD-10-CM

## 2021-07-09 DIAGNOSIS — Z79.1 ENCOUNTER FOR LONG-TERM (CURRENT) USE OF NON-STEROIDAL ANTI-INFLAMMATORIES: ICD-10-CM

## 2021-07-09 DIAGNOSIS — R92.8 ABNORMAL MAMMOGRAM: ICD-10-CM

## 2021-07-09 PROCEDURE — G0279 TOMOSYNTHESIS, MAMMO: HCPCS

## 2021-07-09 RX ORDER — DULOXETIN HYDROCHLORIDE 20 MG/1
CAPSULE, DELAYED RELEASE ORAL
Qty: 90 CAPSULE | Refills: 2 | Status: SHIPPED | OUTPATIENT
Start: 2021-07-09 | End: 2022-01-10 | Stop reason: SDUPTHER

## 2021-07-20 LAB
HPV COMMENT: NORMAL
HPV TYPE 16: NOT DETECTED
HPV TYPE 18: NOT DETECTED
HPVOH (OTHER TYPES): NOT DETECTED

## 2021-09-23 ENCOUNTER — OFFICE VISIT (OUTPATIENT)
Dept: RHEUMATOLOGY | Age: 61
End: 2021-09-23
Payer: COMMERCIAL

## 2021-09-23 VITALS
HEART RATE: 60 BPM | WEIGHT: 152 LBS | SYSTOLIC BLOOD PRESSURE: 116 MMHG | DIASTOLIC BLOOD PRESSURE: 80 MMHG | BODY MASS INDEX: 27.97 KG/M2 | HEIGHT: 62 IN

## 2021-09-23 DIAGNOSIS — Z79.1 ENCOUNTER FOR LONG-TERM (CURRENT) USE OF NON-STEROIDAL ANTI-INFLAMMATORIES: ICD-10-CM

## 2021-09-23 DIAGNOSIS — Q89.8 STICKLER SYNDROME: Primary | ICD-10-CM

## 2021-09-23 PROCEDURE — 99213 OFFICE O/P EST LOW 20 MIN: CPT | Performed by: INTERNAL MEDICINE

## 2021-09-23 RX ORDER — PREDNISONE 1 MG/1
5 TABLET ORAL DAILY
Qty: 30 TABLET | Refills: 3 | Status: SHIPPED | OUTPATIENT
Start: 2021-09-23 | End: 2021-10-23

## 2021-09-23 NOTE — PROGRESS NOTES
Subjective: Maria Luisa Ariza is a 64 y.o. female   The patient returns for follow-up of Stickler syndrome. She is having increasing difficulty with her hands. She continues on Lodine 400 mg twice a day and Cymbalta 20 mg a day. She is using Tylenol as an analgesic up to 3000 mg daily. R  Review of Systems:    Denies abdominal pain denies nausea    Objective:     /80   Pulse 60   Ht 5' 2\" (1.575 m)   Wt 152 lb (68.9 kg)   LMP 12/01/2011   BMI 27.80 kg/m²   Alert female no acute distress. Tenderness at the first CMC's bilaterally with soft tissue swelling at the wrists osteoarthritic changes PIPs pain with extension of the elbows    Assessment:      Stickler syndrome    Plan:      A va recheck riety of additional treatment options including diclofenac gel were discussed. Patient will try prednisone 5 mg a day and if it is helpful she will contact the office so that we can start alendronate to protect her bones. I will see her back in 6 months time.         Kim Ragland MD, MD, 9/23/2021 11:14 AM

## 2021-10-26 ENCOUNTER — TELEPHONE (OUTPATIENT)
Dept: RHEUMATOLOGY | Age: 61
End: 2021-10-26

## 2021-10-26 ENCOUNTER — OFFICE VISIT (OUTPATIENT)
Dept: RHEUMATOLOGY | Age: 61
End: 2021-10-26
Payer: COMMERCIAL

## 2021-10-26 VITALS
DIASTOLIC BLOOD PRESSURE: 90 MMHG | TEMPERATURE: 97.2 F | WEIGHT: 152 LBS | SYSTOLIC BLOOD PRESSURE: 144 MMHG | OXYGEN SATURATION: 99 % | BODY MASS INDEX: 27.8 KG/M2 | HEART RATE: 72 BPM

## 2021-10-26 DIAGNOSIS — M65.9 FLEXOR TENOSYNOVITIS OF THUMB: Primary | ICD-10-CM

## 2021-10-26 PROCEDURE — 99213 OFFICE O/P EST LOW 20 MIN: CPT | Performed by: INTERNAL MEDICINE

## 2021-10-26 NOTE — PROGRESS NOTES
Subjective: Clarisse Lundborg is a 64 y.o. female patient comes in unexpectedly with complaints of severe pain that woke her from sleep in the left wrist and similar but less intense pain in the right wrist.  She denies any unusual activity preceding the onset of symptoms. She is not using prednisone because she said she had visual changes after 3 days on the medication. She is on Lodine 400 mg twice daily      Review of Systems:    Denies fever denies trauma denies cough    Objective:       BP (!) 144/90 (Site: Right Upper Arm)   Pulse 72   Temp 97.2 °F (36.2 °C) (Infrared)   Wt 152 lb (68.9 kg)   LMP 12/01/2011   SpO2 99%   BMI 27.80 kg/m²   Swelling in the snuffbox area of the left hand. Probable flexor tenosynovitis first digit left hand. Assessment:    Flexor tenosynovitis      Plan:    Patient will increase Lodine to 400 mg 3 times daily for the next 3 days and she will call the office to let me know whether this is improved her symptoms. I will see the patient back as previously scheduled.           Serafin Chauhan MD, MD, 10/26/2021 12:27 PM

## 2021-10-26 NOTE — TELEPHONE ENCOUNTER
Patient called and stated that last night in the middle of her sleep at 3 am she felt a sudden pain that she had never experienced before. Her words \"It felt like someone took a knitting needle and tried to rip my veins out of my wrist\"    She says it started in her left wrist first and then almost simultaneously the right started doing the same but slightly less painful than the left. Patient says she applied Voltaren gel and wrapped her wrists in wrist wraps. She states the her pain is unbearable to the point where she can't use her hands even to hold a pencil. She would like to know what is wrong with her.  Please give her a call at  [phone]

## 2022-01-10 DIAGNOSIS — Z79.1 ENCOUNTER FOR LONG-TERM (CURRENT) USE OF NON-STEROIDAL ANTI-INFLAMMATORIES: ICD-10-CM

## 2022-01-10 DIAGNOSIS — Q89.8 STICKLER SYNDROME: ICD-10-CM

## 2022-01-10 RX ORDER — DULOXETIN HYDROCHLORIDE 20 MG/1
CAPSULE, DELAYED RELEASE ORAL
Qty: 90 CAPSULE | Refills: 1 | Status: SHIPPED | OUTPATIENT
Start: 2022-01-10 | End: 2022-09-01

## 2022-01-10 RX ORDER — ETODOLAC 400 MG/1
400 TABLET, FILM COATED ORAL 2 TIMES DAILY
Qty: 180 TABLET | Refills: 1 | Status: SHIPPED | OUTPATIENT
Start: 2022-01-10 | End: 2022-09-01

## 2022-03-11 ENCOUNTER — HOSPITAL ENCOUNTER (OUTPATIENT)
Dept: WOMENS IMAGING | Age: 62
Discharge: HOME | End: 2022-03-11
Payer: COMMERCIAL

## 2022-03-11 DIAGNOSIS — R92.8 ABNORMAL MAMMOGRAM: ICD-10-CM

## 2022-03-11 PROCEDURE — G0279 TOMOSYNTHESIS, MAMMO: HCPCS

## 2022-03-24 ENCOUNTER — OFFICE VISIT (OUTPATIENT)
Dept: RHEUMATOLOGY | Age: 62
End: 2022-03-24
Payer: COMMERCIAL

## 2022-03-24 VITALS
WEIGHT: 160 LBS | BODY MASS INDEX: 29.44 KG/M2 | DIASTOLIC BLOOD PRESSURE: 80 MMHG | HEART RATE: 72 BPM | SYSTOLIC BLOOD PRESSURE: 118 MMHG | HEIGHT: 62 IN

## 2022-03-24 DIAGNOSIS — Z79.1 ENCOUNTER FOR LONG-TERM (CURRENT) USE OF NON-STEROIDAL ANTI-INFLAMMATORIES: ICD-10-CM

## 2022-03-24 DIAGNOSIS — Q89.8 STICKLER SYNDROME: Primary | ICD-10-CM

## 2022-03-24 DIAGNOSIS — Q89.8 STICKLER SYNDROME: ICD-10-CM

## 2022-03-24 LAB
ALBUMIN SERPL-MCNC: 4.6 G/DL (ref 3.4–5)
ALP BLD-CCNC: 80 U/L (ref 40–129)
ALT SERPL-CCNC: 12 U/L (ref 10–40)
AST SERPL-CCNC: 16 U/L (ref 15–37)
BASOPHILS ABSOLUTE: 0 K/UL (ref 0–0.2)
BASOPHILS RELATIVE PERCENT: 0.4 %
BILIRUB SERPL-MCNC: 0.3 MG/DL (ref 0–1)
BILIRUBIN DIRECT: <0.2 MG/DL (ref 0–0.3)
BILIRUBIN, INDIRECT: NORMAL MG/DL (ref 0–1)
CREAT SERPL-MCNC: 0.7 MG/DL (ref 0.6–1.1)
EOSINOPHILS ABSOLUTE: 0.1 K/UL (ref 0–0.6)
EOSINOPHILS RELATIVE PERCENT: 1.7 %
GFR AFRICAN AMERICAN: >60
GFR NON-AFRICAN AMERICAN: >60
HCT VFR BLD CALC: 42.7 % (ref 36–48)
HEMOGLOBIN: 14.5 G/DL (ref 12–16)
LYMPHOCYTES ABSOLUTE: 1.5 K/UL (ref 1–5.1)
LYMPHOCYTES RELATIVE PERCENT: 28.1 %
MCH RBC QN AUTO: 30.4 PG (ref 26–34)
MCHC RBC AUTO-ENTMCNC: 34 G/DL (ref 31–36)
MCV RBC AUTO: 89.5 FL (ref 80–100)
MONOCYTES ABSOLUTE: 0.4 K/UL (ref 0–1.3)
MONOCYTES RELATIVE PERCENT: 6.7 %
NEUTROPHILS ABSOLUTE: 3.3 K/UL (ref 1.7–7.7)
NEUTROPHILS RELATIVE PERCENT: 63.1 %
PDW BLD-RTO: 12.8 % (ref 12.4–15.4)
PLATELET # BLD: 228 K/UL (ref 135–450)
PMV BLD AUTO: 9.4 FL (ref 5–10.5)
RBC # BLD: 4.78 M/UL (ref 4–5.2)
TOTAL PROTEIN: 6.7 G/DL (ref 6.4–8.2)
WBC # BLD: 5.2 K/UL (ref 4–11)

## 2022-03-24 PROCEDURE — G8484 FLU IMMUNIZE NO ADMIN: HCPCS | Performed by: INTERNAL MEDICINE

## 2022-03-24 PROCEDURE — 3017F COLORECTAL CA SCREEN DOC REV: CPT | Performed by: INTERNAL MEDICINE

## 2022-03-24 PROCEDURE — 99213 OFFICE O/P EST LOW 20 MIN: CPT | Performed by: INTERNAL MEDICINE

## 2022-03-24 PROCEDURE — 1036F TOBACCO NON-USER: CPT | Performed by: INTERNAL MEDICINE

## 2022-03-24 PROCEDURE — G8417 CALC BMI ABV UP PARAM F/U: HCPCS | Performed by: INTERNAL MEDICINE

## 2022-03-24 PROCEDURE — G8427 DOCREV CUR MEDS BY ELIG CLIN: HCPCS | Performed by: INTERNAL MEDICINE

## 2022-05-17 ENCOUNTER — OFFICE VISIT (OUTPATIENT)
Dept: ENT CLINIC | Age: 62
End: 2022-05-17
Payer: COMMERCIAL

## 2022-05-17 VITALS
BODY MASS INDEX: 29.63 KG/M2 | TEMPERATURE: 96.9 F | SYSTOLIC BLOOD PRESSURE: 176 MMHG | HEART RATE: 69 BPM | OXYGEN SATURATION: 98 % | WEIGHT: 162 LBS | DIASTOLIC BLOOD PRESSURE: 113 MMHG

## 2022-05-17 DIAGNOSIS — H61.91 LESION OF EXTERNAL EAR CANAL, RIGHT: Primary | ICD-10-CM

## 2022-05-17 DIAGNOSIS — H92.11 OTORRHEA OF RIGHT EAR: ICD-10-CM

## 2022-05-17 PROCEDURE — 4130F TOPICAL PREP RX AOE: CPT | Performed by: OTOLARYNGOLOGY

## 2022-05-17 PROCEDURE — G8417 CALC BMI ABV UP PARAM F/U: HCPCS | Performed by: OTOLARYNGOLOGY

## 2022-05-17 PROCEDURE — 3017F COLORECTAL CA SCREEN DOC REV: CPT | Performed by: OTOLARYNGOLOGY

## 2022-05-17 PROCEDURE — 99203 OFFICE O/P NEW LOW 30 MIN: CPT | Performed by: OTOLARYNGOLOGY

## 2022-05-17 PROCEDURE — G8427 DOCREV CUR MEDS BY ELIG CLIN: HCPCS | Performed by: OTOLARYNGOLOGY

## 2022-05-17 PROCEDURE — 1036F TOBACCO NON-USER: CPT | Performed by: OTOLARYNGOLOGY

## 2022-05-17 ASSESSMENT — ENCOUNTER SYMPTOMS
SORE THROAT: 0
SINUS PAIN: 0
RHINORRHEA: 0

## 2022-05-17 NOTE — PROGRESS NOTES
[facility]       PCP:  Aaron Ribeiro MD      [facility]  Chief Complaint   Patient presents with    Ear Problem     right ear drainage and mass in the ear        HISTORY OF PRESENT ILLNESS       Glen Ty is a 63 y.o. female referred here for evaluation and treatment of Two weeks ago saw audiologist, and would not do the test corinne she looked in the right ear and saw a mass. Had tympanoplasty right ear. Ear that has never drained is now draining for two months. Nothing unusual with ear canal hearing aid fit and no pain prior to two months ago. No pain the entire time. Hearing is normal when able to wear the hearing aid. [facility]\" on [place]     Federico rodrigues      REVIEW OF SYSTEMS   Review of Systems   Constitutional: Negative for chills and fever. HENT: Positive for ear discharge (see HPI), hearing loss and tinnitus (both ears for many years, loder at times and sometimes very loud). Negative for congestion, ear pain, postnasal drip, rhinorrhea, sinus pain and sore throat. Neurological: Negative for dizziness. PAST MEDICAL HISTORY    Past Medical History:   Diagnosis Date    Arthritis     lt knee/hands    Right knee pain     Stickler syndrome        Past Surgical History:   Procedure Laterality Date    CLEFT PALATE REPAIR  1964 and 1971    DILATION AND CURETTAGE OF UTERUS  12/9/11    HYSTEROSCOPY,NOVASURE ABLATION    MANDIBLE SURGERY  1990    OVARY REMOVAL      and cyst    TOTAL KNEE ARTHROPLASTY  3/25/11    Left         EXAMINATION    Vitals:    05/17/22 1301   BP: (!) 176/113   Site: Left Upper Arm   Position: Sitting   Cuff Size: Medium Adult   Pulse: 69   Temp: 96.9 °F (36.1 °C)   TempSrc: Temporal   SpO2: 98%   Weight: 162 lb (73.5 kg)     General:  WDWN, NAD, alert and oriented  Face: There was no swelling or lesions detected.     Voice: Normal with no hoarseness or hot potato voice. (+) Ears:  Swelling or fullness right posterior EAC consistent with epidermal inclusion cyst, nonerythematous nontender occluding about 75% of the EAC near the meatus. TM dull thick , nonerythematous, but not able to see well enough to determine mobility of TM with pneumatic massage. Cerumen impact filling the right EAC was removed with Erickson suction. Left TM and EAC appeared to be normal.  Binaural binocular otomicroscopy performed. Nose: The nasal septum, turbinates, secretions, and mucosa appeared to be normal.   Sinuses:  Maxillary and frontal sinuses were nontender to palpation and percussion. Oral cavity:  Mucosa, secretions, tongue, and gingiva appeared to be normal.   Oropharynx:  The palatine tonsils, hard and soft palates, uvula, tongue, posterior oropharyngeal wall, mucosa and secretions appeared to be normal.     Salivary Glands:  Normal bilateral parotid and bilateral submandibular salivary glands. Neck:  No masses or tenderness. Trachea midline. Laryngeal cartilages and hyoid bone normal.    Thyroid:  Normal, nontender, no goiter or nodules palpable. Lymph nodes:  No cervical lymphadenopathy. Ulysees Gum / Rollo Debi / Krishna Wen was seen today for ear problem. Diagnoses and all orders for this visit:    Lesion of external ear canal, right  Comments:  mass, probable epidermal inclusion cyst  Orders:  -     CT IAC POSTERIOR FOSSA [address], Audiology, [facility]    Otorrhea of right ear      RECOMMENDATIONS/PLAN     1. Consider excision of mass of EAC. 2. Return in about 3 weeks (around 6/7/2022) for recheck/follow-up after hearing test and CT scan. Khadijah Davalos

## 2022-05-23 ENCOUNTER — HOSPITAL ENCOUNTER (OUTPATIENT)
Dept: CT IMAGING | Age: 62
Discharge: HOME | End: 2022-05-23
Payer: COMMERCIAL

## 2022-05-23 DIAGNOSIS — H61.91 LESION OF EXTERNAL EAR CANAL, RIGHT: ICD-10-CM

## 2022-05-23 PROCEDURE — 70480 CT ORBIT/EAR/FOSSA W/O DYE: CPT

## 2022-06-09 ENCOUNTER — PROCEDURE VISIT (OUTPATIENT)
Dept: AUDIOLOGY | Age: 62
End: 2022-06-09
Payer: COMMERCIAL

## 2022-06-09 ENCOUNTER — OFFICE VISIT (OUTPATIENT)
Dept: ENT CLINIC | Age: 62
End: 2022-06-09
Payer: COMMERCIAL

## 2022-06-09 VITALS
SYSTOLIC BLOOD PRESSURE: 156 MMHG | HEART RATE: 66 BPM | DIASTOLIC BLOOD PRESSURE: 94 MMHG | TEMPERATURE: 96.9 F | OXYGEN SATURATION: 97 %

## 2022-06-09 DIAGNOSIS — H90.A21 SENSORINEURAL HEARING LOSS (SNHL) OF RIGHT EAR WITH RESTRICTED HEARING OF LEFT EAR: Chronic | ICD-10-CM

## 2022-06-09 DIAGNOSIS — H90.A31 MIXED CONDUCTIVE AND SENSORINEURAL HEARING LOSS OF RIGHT EAR WITH RESTRICTED HEARING OF LEFT EAR: Chronic | ICD-10-CM

## 2022-06-09 DIAGNOSIS — J01.00 ACUTE NON-RECURRENT MAXILLARY SINUSITIS: Primary | ICD-10-CM

## 2022-06-09 DIAGNOSIS — H93.8X1 MASS OF RIGHT EAR CANAL: Chronic | ICD-10-CM

## 2022-06-09 DIAGNOSIS — H90.A31 MIXED CONDUCTIVE AND SENSORINEURAL HEARING LOSS OF RIGHT EAR WITH RESTRICTED HEARING OF LEFT EAR: Primary | ICD-10-CM

## 2022-06-09 DIAGNOSIS — H93.13 SUBJECTIVE TINNITUS OF BOTH EARS: ICD-10-CM

## 2022-06-09 DIAGNOSIS — H92.11 OTORRHEA OF RIGHT EAR: ICD-10-CM

## 2022-06-09 PROCEDURE — 3017F COLORECTAL CA SCREEN DOC REV: CPT | Performed by: OTOLARYNGOLOGY

## 2022-06-09 PROCEDURE — 92557 COMPREHENSIVE HEARING TEST: CPT | Performed by: AUDIOLOGIST

## 2022-06-09 PROCEDURE — 1036F TOBACCO NON-USER: CPT | Performed by: OTOLARYNGOLOGY

## 2022-06-09 PROCEDURE — 99214 OFFICE O/P EST MOD 30 MIN: CPT | Performed by: OTOLARYNGOLOGY

## 2022-06-09 PROCEDURE — 92567 TYMPANOMETRY: CPT | Performed by: AUDIOLOGIST

## 2022-06-09 PROCEDURE — G8427 DOCREV CUR MEDS BY ELIG CLIN: HCPCS | Performed by: OTOLARYNGOLOGY

## 2022-06-09 PROCEDURE — G8417 CALC BMI ABV UP PARAM F/U: HCPCS | Performed by: OTOLARYNGOLOGY

## 2022-06-09 RX ORDER — CEFDINIR 300 MG/1
600 CAPSULE ORAL DAILY
Qty: 28 CAPSULE | Refills: 0 | Status: SHIPPED | OUTPATIENT
Start: 2022-06-09 | End: 2022-06-23

## 2022-06-09 NOTE — PROGRESS NOTES
[facility]  Division of Audiology/Otolaryngology    6/9/2022     Patient name: Ligia Mora  Primary Care Physician: Catherine Chavez MD   Medical Record Number: [id number]     Ligia Mora   1960, 63 y.o. female   [id number]       Referring Provider: Clarita Aly MD  Referral Type: In an order in [address] [place] [address]    Reason for Visit: Evaluation of the cause of disorders of hearing, tinnitus, or balance. ADULT AUDIOLOGIC EVALUATION                    Ligia Mora is a 63 y.o. female seen today, 6/9/2022 , for a same-day comprehensive audiologic evaluation. Patient was seen by Clarita Aly MD following today's evaluation. AUDIOLOGIC AND OTHER PERTINENT MEDICAL HISTORY:      Ligia Mora presents with longstanding history of right sided asymmetrical hearing loss. Her audiologist referred her for ENT evaluation upon seeing a \"mass\" in right ear upon visual otoscopy. She noted this was confirmed on CT scan. Wears amplification with success. Prior audiogram in 2017 confirmed right sided asymmetry. She reports tinnitus in right ear has increased, constantly hears it. The tinnitus is debilitating and makes her very anxious, affecting her quality of life. IMPRESSIONS:      Today's results are consistent with right asymmetrical hearing loss with decreased word recognition score for both ears, and there is evidence of right middle ear dysfunction, with normal middle ear function of left ear. Hearing loss is significant enough to result in difficulty understanding speech in all istening environments. Patient to follow medical recommendations per  Clarita Aly MD.     ASSESSMENT AND FINDINGS:     Otoscopy revealed: Visible tympanic membrane in left ear, unable to visualize tympanic membrane in right ear. Reliability: Good   Transducer: Inserts    RIGHT EAR:  Hearing Sensitivity: Profound mixed hearing loss with large air-bone gaps across from 500-4000K.   Speech Recognition Threshold: 90 dB HL  Word Recognition: Good (80-89%), based on NU-6   Tympanometry: Flat, no peak pressure or compliance, Type B tympanogram, with typical ear canal volume, consistent with middle ear fluid. Acoustic Reflexes: Ipsilateral: Absent at isolated frequencies. LEFT EAR:  Hearing Sensitivity: Moderate mixed  hearing loss with 15-20 dB air-bone gaps at 3000 and 4000K. Speech Recognition Threshold: 50 dB HL  Word Recognition: Excellent (%), based on NU-6   Tympanometry: Normal peak pressure and compliance, Type A tympanogram, consistent with normal middle ear function. Acoustic Reflexes: Ipsilateral: Present at elevated sensation levels and Absent at isolated frequencies. Left ear hearing decreased from 2017 audiogram, right ear hearing remained stable        5/11/2017 audiogram        COUNSELING:      Reviewed purpose of testing completed today, general auditory system function, and results obtained today. The following items are recommended based on patient report and results from today's appointment:   - Continue medical follow-up with Clarice Hooker MD.   - Retest hearing as medically indicated and/or sooner if a change in hearing is noted. - Maintain a sound enriched environment to assist in the management of tinnitus symptoms. Chart CC'd to: Clarice Hooker MD      Degree of   Hearing Sensitivity dB Range   Within Normal Limits (WNL) 0 - 20   Mild 20 - 40   Moderate 40 - 55   Moderately-Severe 55 - 70   Severe 70 - 90   Profound 90 +        RECOMMENDATIONS:        Clarice Hooker MD to medically advise.      Joe Huynh  Audiologist    Electronically signed by Joe Huynh on 6/9/22 at 1:05 PM.

## 2022-06-09 NOTE — PROGRESS NOTES
[facility]       PCP:  Susana Hernandez MD      [facility]  Chief Complaint   Patient presents with    Ear Problem     mass external auditory canal    Hearing Loss       HISTORY OF PRESENT ILLNESS       Asaf Tanner is a 63 y.o. female here for recheck and follow up of a mass in the right external auditory canal and hearing loss. She denied any ear pain through the course of this problem. PAST MEDICAL HISTORY    Past Medical History:   Diagnosis Date    Arthritis     lt knee/hands    Right knee pain     Stickler syndrome          Past Surgical History:   Procedure Laterality Date    CLEFT PALATE REPAIR  1964 and 1971    DILATION AND CURETTAGE OF UTERUS  12/9/11    HYSTEROSCOPY,NOVASURE ABLATION    MANDIBLE SURGERY  1990    OVARY REMOVAL      and cyst    TOTAL KNEE ARTHROPLASTY  3/25/11    Left         EXAMINATION    Vitals:    06/09/22 1418   BP: (!) 156/94   Site: Left Upper Arm   Position: Sitting   Cuff Size: Medium Adult   Pulse: 66   Temp: 96.9 °F (36.1 °C)   TempSrc: Temporal   SpO2: 97%     General:  WDWN, NAD, alert and oriented  Face: There was no swelling or lesions detected. Voice: Normal with no hoarseness or hot potato voice. Ears:  OMscopy again demonstrated the subcutaneous mass in the right posterior EAC, soft, right ear without change. The remainder of the EAC appeared to be normal.  The right TM appeared to be dull, thick, nonerythematous, but visualization was suboptimal.              REVIEW OF IMAGES          I independently reviewed the images of the CT scan of the IACs/brain/head, showing a large mass in the posterior canal and mastoid extending to the floor of the right middle cranial fossa, probable recurrent cholesteatoma. Also, right maxillary sinusitis is seen.       Narrative   EXAMINATION:   CT OF THE INTERNAL AUDITORY CANAL WITHOUT CONTRAST 5/23/2022 9:09 am:     TECHNIQUE:   CT of the internal auditory canal was performed without contrast was   performed without the administration of intravenous contrast. Multiplanar   reformatted images are provided for review. Automated exposure control,   iterative reconstruction, and/or weight based adjustment of the mA/kV was   utilized to reduce the radiation dose to as low as reasonably achievable.       COMPARISON:   None.       HISTORY:   ORDERING SYSTEM PROVIDED HISTORY: Lesion of external ear canal, right           FINDINGS:   RIGHT TEMPORAL BONE: Teena Bell is a large soft tissue mass involving the right   external auditory canal measuring approximately 19 x 16 mm in transaxial   dimensions and 19 mm in craniocaudal extent.  There is corresponding bony   scalloping of the mastoid segment temporal bone, corresponding obstruction of   the external auditory canal as well as scalping extending to the floor of the   right middle fossa with dehiscence of the bony floor.  There is thickening   and retraction of the right tympanic membrane with few dystrophic   calcifications with abutment of the long process of the incus.  No gross   erosive changes of the ossicles.  Complete opacification throughout   diminutive right mastoid air cells.  Otic capsule is well mineralized.  Inner   ear structures and vestibular aqueduct appear normal.  The carotid canal and   jugular bulb appear unremarkable.       LEFT TEMPORAL BONE: The external auditory canal is clear without evidence of   bony erosion.  The scutum is intact.  The middle ear cavity is clear.  The   ossicular chain is intact.  The mastoid air cells are clear.  The inner ear   structures appear unremarkable.  Normal mineralization of the otic capsule.    The internal auditory canal and vestibular aqueduct appear unremarkable.  The   carotid canal is normal in appearance.  The jugular bulb is unremarkable.       BRAIN: The visualized portion of the intracranial contents appear unremarkable.       ORBITS: The visualized portion of the orbits demonstrate no acute abnormality.       SINUSES: Internal fixation screws noted along the antral wall of the   maxillary sinuses.  There is mild-to-moderate lobulated mucosal thickening   throughout the right maxillary sinus.  Large periapical cyst associated with   the posterior-most right maxillary molar protruding into the maxillary sinus.           Impression   1. Large soft tissue mass involving the right external auditory canal with   associated bony scalloping extending to the floor of the right middle cranial   fossa with dehiscence of the bony floor.  Findings are favored to reflect a   EAC cholesteatoma, however other benign or malignant etiologies is possible. 2. Complete opacification throughout diminutive right mastoid air cells. 3. Thickened and retracted right tympanic membrane with abutment of the incus.                  IMPRESSION / DIAGNOSES / Олег Mireles was seen today for ear problem and hearing loss. Diagnoses and all orders for this visit:    Acute non-recurrent maxillary sinusitis  -     cefdinir (OMNICEF) 300 MG capsule; Take 2 capsules by mouth daily for 14 days Take both capsules together at the same time, once daily. Mass of right ear canal  Comments:  and probable mastoid cavity    Otorrhea of right ear    Mixed conductive and sensorineural hearing loss of right ear with restricted hearing of left ear    Sensorineural hearing loss (SNHL) of right ear with restricted hearing of left ear         RECOMMENDATIONS/PLAN      1. Management of sinusitis. See Patient Instructions on file for this visit, which were discussed with the patient. 2. Referral to neurotologist for probable revision of mastoidectomy. 3. Return in about 6 weeks (around 7/21/2022) for recheck/follow-up, and sooner if condition worsens.         TIME:  I spent a total of at least 31 minutes with this patient for pre-visit review of the medical record, history, physical examination, counseling, reviewing and discussing the CT scan, reviewing and discussing the audiogram findings, coordination of care and discussing referral to neurotologist, reviewing the medical record, and documenting the visit.

## 2022-06-09 NOTE — PATIENT INSTRUCTIONS
RHINOSINUSITIS CARE  · You may use acetaminophen (eg. Tylenol) or Ibuprofen (eg. Advil) (over the counter medications) as needed for fever or pain. · Take Probiotic while you are taking antibiotics, to prevent diarrhea, stomach upset, pseudomembranous colitis, and C. difficile diarrhea. This may be obtained at your pharmacy or health food store. Alternatively, you may eat one cup of yogurt with active or live cultures twice daily while taking the antibiotic. Continue for two to three days after completion of the antibiotic. · Use a 12 hour decongestant spray, 0.05% oxymetazoline (e.g. Afrin, Duration, 4-Way). Spray each nostril twice three times a day for three days, then two times a day for 2 days, and then stop for two days and then repeat the cycle once. · Take one Mucinex-D (red orange box) maximum strength 1200 mg tablet each morning and one Mucinex (blue box) maximum strength 1200 mg tablet each evening, about 12 hours later, daily for the next ten days. · A steam inhaler mask device may be helpful. These can be purchased at your pharmacy. · Use fluticasone 2 sprays in each nostril once daily for the next 14 days. · It may take several days to several weeks for your sinusitis to clear up. It is important to take all your medications as prescribed. Please continue your antibiotics as prescribed.     · Please call the office if your condition worsens or if symptoms persist after treatment is completed.        ===================================================================      ADVERSE AND SIDE EFFECTS OF MEDICATIONS:    Please be aware of the following possible adverse reactions, side effects, and complications of the following medications, including, but not limited to: allergic reaction, interactions with other medications, nausea, headache, diarrhea, persistent symptoms, failure to improve, and the following:     Omnicef (antibiotic):  diarrhea, colitis (severe infection and inflammation of the large intestine), pseudomembranous colitis (severe infection and inflammation of the colon, usually due to C. difficile bacteria)  yeast or fungal  infections, Nice-Ancelmo syndrome (very rare necrotic skin reaction with peeling of skin, blisters and arthritis), persistent symptoms/infection, and failure to improve. Fluticasone:  nosebleed, burning sensation, atrophy of nasal mucosa, septal ulceration or perforation, nasal irritation, nasal yeast infection,  drowsiness, bad taste. Taking Probiotic while you are taking antibiotics, may help to prevent diarrhea, stomach upset, pseudomembranous colitis, and C. difficile diarrhea. This may be obtained at your pharmacy or health food store. Alternatively, you may eat one cup of yogurt with active or live cultures twice daily while taking the antibiotic. Continue for two to three days after completion of the antibiotic.    ~~~>>> Also please read the medication information in this AVS and all information given to you by the pharmacist.         Patient Education        cefdinir  Pronunciation: NILA gonzalez  Brand: Palma Hendrickson Omni-Pac  What is the most important information I should know about cefdinir? Do not take this medicine if you are allergic to cefdinir, or to similarantibiotics, such as Ceftin, Cefzil, Keflex, and others. What is cefdinir? Cefdinir is a cephalosporin (SEF a low spor in) antibiotic that is used totreat many different types of infections caused by bacteria. Cefdinir may also be used for purposes not listed in this medication guide. What should I discuss with my healthcare provider before taking cefdinir? You should not take this medicine if you are allergic to cefdinir or any other cephalosporin antibiotic (cefadroxil, cefprozil, cefazolin, cefalexin, Keflex,and others).   Tell your doctor if you have ever had:   kidney disease (or if you are on dialysis);   intestinal problems, such as colitis; or   an allergy to any drugs (especially penicillins). Cefdinir liquid contains sucrose. Talk to your doctor before using this form of cefdinir ifyou have diabetes. Tell your doctor if you are pregnant or breastfeeding. How should I take cefdinir? Follow all directions on your prescription label and read all medicine guidesor instruction sheets. Use the medicine exactly as directed. Shake the oral suspension (liquid) before you measure a dose. Use the dosing syringe provided, or use amedicine dose-measuring device (not a kitchen spoon). You may take cefdinir with or without food. Use this medicine for the full prescribed length of time, even if your symptoms quickly improve. Skipping doses can increase your risk of infection that is resistant to medication. Cefdinir will not treat a viral infection such as theflu or a common cold. Cefdinir can affect the results of certain medical tests. Tell any doctor whotreats you that you are using cefdinir. Store at room temperature away from moisture and heat. Throw away any unusedcefdinir liquid that is older than 10 days. What happens if I miss a dose? Take the medicine as soon as you can, but skip the missed dose if it is almost time for your next dose. Do not take two doses at one time. What happens if I overdose? Seek emergency medical attention or call the Poison Help line at 1-148.791.6322. Overdose symptoms may include nausea, vomiting, stomach pain, diarrhea, or aseizure. What should I avoid while taking cefdinir? Avoid using antacids or mineral supplements that contain aluminum, magnesium, or iron within 2 hours before or after taking cefdinir. Antacids or iron can make it harder for your body to absorb cefdinir. This does not include baby formula fortified with iron. Antibiotic medicines can cause diarrhea, which may be a sign of a new infection. If you have diarrhea that is watery or bloody, call your doctor before using anti-diarrhea medicine.   What are the possible side effects of cefdinir? Get emergency medical help if you have signs of an allergic reaction (hives, difficult breathing, swelling in your face or throat) or a severe skin reaction (fever, sore throat, burning eyes, skin pain, red or purple skin rash withblistering and peeling). Call your doctor at once if you have:   severe stomach pain, diarrhea that is watery or bloody (even if it occurs months after your last dose);   fever, chills, body aches, flu symptoms;   pale skin, easy bruising, unusual bleeding;   seizure (convulsions);   fever, weakness, confusion;   dark colored urine, jaundice (yellowing of the skin or eyes); or   kidney problems --little or no urination, swelling in your feet or ankles, feeling tired or short of breath. Common side effects may include:   nausea, vomiting, stomach pain, diarrhea;   vaginal itching or discharge;   headache; or   rash (including diaper rash in an infant taking liquid cefdinir. This is not a complete list of side effects and others may occur. Call your doctor for medical advice about side effects. You may report side effects toFDA at 2-000-UQS-4754. What other drugs will affect cefdinir? Tell your doctor about all your other medicines, especially:   probenecid; or   vitamin or mineral supplements that contain iron. This list is not complete. Other drugs may affect cefdinir, including prescription and over-the-counter medicines, vitamins, and herbal products. Notall possible drug interactions are listed here. Where can I get more information? Your pharmacist can provide more information about cefdinir. Remember, keep this and all other medicines out of the reach of children, never share your medicines with others, and use this medication only for the indication prescribed.    Every effort has been made to ensure that the information provided by [address] is accurate, up-to-date, and complete, but no guarantee is made to that effect. Drug information contained herein may be time sensitive. [facility] information has been compiled for use by healthcare practitioners and consumers in the [place] and therefore [employer] does not warrant that uses outside of the [place] are appropriate, unless specifically indicated otherwise. [facility]'s drug information does not endorse drugs, diagnose patients or recommend therapy. [facility] drug information is an informational resource designed to assist licensed healthcare practitioners in caring for their patients and/or to serve consumers viewing this service as a supplement to, and not a substitute for, the expertise, skill, knowledge and judgment of healthcare practitioners. The absence of a warning for a given drug or drug combination in no way should be construed to indicate that the drug or drug combination is safe, effective or appropriate for any given patient. [facility] does not assume any responsibility for any aspect of healthcare administered with the aid of information [facility] provides. The information contained herein is not intended to cover all possible uses, directions, precautions, warnings, drug interactions, allergic reactions, or adverse effects. If you have questions about the drugs you are taking, check with yourdoctor, nurse or pharmacist.  Copyright 0238-1738 [address] [place] [address] Dr CHEUNG. Version: 7.03. Revision date: 1/4/2021. Care instructions adapted under license by [facility] ([facility]). If you have questions about a medical condition or this instruction, always ask your healthcare professional. [place] [address] any warranty or liability for your use of this information.

## 2022-06-10 ENCOUNTER — PATIENT MESSAGE (OUTPATIENT)
Dept: ENT CLINIC | Age: 62
End: 2022-06-10

## 2022-06-10 NOTE — TELEPHONE ENCOUNTER
From: Mamie Moura  To: Dr. Tori Myers: 6/10/2022 10:22 AM EDT  Subject: Question regarding CT Inner Auditory Canals/Posterior Fossa Without Contrast    My Audiologist Carmina Landau would like a copy of my office notes and results of my CT scan. If possible could your office fax these to [phone]. Her office is located at [facility]  [id number].      Pily Carrington

## 2022-06-13 ENCOUNTER — OFFICE VISIT (OUTPATIENT)
Dept: ORTHOPEDIC SURGERY | Age: 62
End: 2022-06-13
Payer: COMMERCIAL

## 2022-06-13 VITALS — WEIGHT: 162 LBS | HEIGHT: 62 IN | BODY MASS INDEX: 29.81 KG/M2 | RESPIRATION RATE: 16 BRPM

## 2022-06-13 DIAGNOSIS — G89.29 CHRONIC PAIN OF BOTH KNEES: Primary | ICD-10-CM

## 2022-06-13 DIAGNOSIS — M17.11 PRIMARY OSTEOARTHRITIS OF RIGHT KNEE: ICD-10-CM

## 2022-06-13 DIAGNOSIS — M25.561 CHRONIC PAIN OF BOTH KNEES: Primary | ICD-10-CM

## 2022-06-13 DIAGNOSIS — M25.562 CHRONIC PAIN OF BOTH KNEES: Primary | ICD-10-CM

## 2022-06-13 PROCEDURE — 20610 DRAIN/INJ JOINT/BURSA W/O US: CPT | Performed by: PHYSICIAN ASSISTANT

## 2022-06-13 PROCEDURE — G8417 CALC BMI ABV UP PARAM F/U: HCPCS | Performed by: PHYSICIAN ASSISTANT

## 2022-06-13 PROCEDURE — 99213 OFFICE O/P EST LOW 20 MIN: CPT | Performed by: PHYSICIAN ASSISTANT

## 2022-06-13 PROCEDURE — 3017F COLORECTAL CA SCREEN DOC REV: CPT | Performed by: PHYSICIAN ASSISTANT

## 2022-06-13 PROCEDURE — G8427 DOCREV CUR MEDS BY ELIG CLIN: HCPCS | Performed by: PHYSICIAN ASSISTANT

## 2022-06-13 PROCEDURE — 1036F TOBACCO NON-USER: CPT | Performed by: PHYSICIAN ASSISTANT

## 2022-06-13 RX ORDER — BUPIVACAINE HYDROCHLORIDE 2.5 MG/ML
2 INJECTION, SOLUTION INFILTRATION; PERINEURAL ONCE
Status: COMPLETED | OUTPATIENT
Start: 2022-06-13 | End: 2022-06-13

## 2022-06-13 RX ORDER — TRIAMCINOLONE ACETONIDE 40 MG/ML
40 INJECTION, SUSPENSION INTRA-ARTICULAR; INTRAMUSCULAR ONCE
Status: COMPLETED | OUTPATIENT
Start: 2022-06-13 | End: 2022-06-13

## 2022-06-13 RX ADMIN — BUPIVACAINE HYDROCHLORIDE 5 MG: 2.5 INJECTION, SOLUTION INFILTRATION; PERINEURAL at 15:51

## 2022-06-13 RX ADMIN — TRIAMCINOLONE ACETONIDE 40 MG: 40 INJECTION, SUSPENSION INTRA-ARTICULAR; INTRAMUSCULAR at 15:51

## 2022-06-15 NOTE — PROGRESS NOTES
This dictation was done with [employer] dictation and may contain mechanical errors related to translation. I have today reviewed with Maria Jacobo the clinically relevant, past medical history, medications, allergies, family history, social history, and Review Of Systems form the patients most recent history form & I have documented any details relevant to today's presenting complaints in my history below. Ms. Tesfaye Rivas self-reported past medical history, medications, allergies, family history, social history, and Review Of Systems form has been scanned into the chart under the \"Media\" tab. Subjective: Maria Jacobo is a 63 y.o. who is here complaining of pain in her knees over the last several weeks. 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 She had a right knee right knee has a almost a 5 out of 10 pain but yesterday was like a 9 out of 10 pain. Left knee had a total knee replacement by Dr. Daisha Chacko in 2011. The left knee does not have pain but we went ahead and get x-rays of both knees today including standing AP lateral and a sunrise view. Patient Active Problem List   Diagnosis    Stickler syndrome    Joint pain    Intercostal pain    Essential hypertension    Hearing loss of both ears    Chronic tubotympanic suppurative otitis media of right ear    Mixed hearing loss    Sensorineural hearing loss (SNHL) of left ear with restricted hearing of right ear    Subjective tinnitus of both ears    Trigger middle finger of right hand    Otalgia, right ear    S/P total knee arthroplasty, left 3.25.11    Effusion of left knee    Scapholunate advanced collapse of left wrist           Current Outpatient Medications on File Prior to Visit   Medication Sig Dispense Refill    cefdinir (OMNICEF) 300 MG capsule Take 2 capsules by mouth daily for 14 days Take both capsules together at the same time, once daily.  28 capsule 0    DULoxetine (CYMBALTA) 20 MG extended release capsule Take 1 capsule by mouth one time a day. 90 capsule 1    etodolac (LODINE) 400 MG tablet Take 1 tablet by mouth 2 times daily 180 tablet 1    Multiple Vitamins-Minerals (CENTRUM SILVER 50+WOMEN) TABS Take by mouth daily      Misc Natural Products (OSTEO BI-FLEX ADV TRIPLE ST) TABS Take by mouth       No current facility-administered medications on file prior to visit. Objective:   Resp. rate 16, height 5' 2\" (1.575 m), weight 162 lb (73.5 kg), last menstrual period 12/01/2011, not currently breastfeeding. On examination is a very pleasant 61-year-old female who is alert and orient x3 she has some medial joint line tenderness but not a true Marlon or Lachman she has crepitus during flexion extension through the patellofemoral joint of the right knee. Left knee has good tracking good overall range of motion of 0-130 without any varus or valgus laxity a well-healed incision decent quad tone. Neuro exam grossly intact both lower extremities. Intact sensation to light touch. Motor exam 4+ to 5/5 in all major motor groups. Negative Javed's sign. Skin is warm, dry and intact with out erythema or significant increased temperature around the knee joint(s). There are no cutaneous lesions or lymphadenopathy present. X-RAYS:  X-rays taken the office today show excellent alignment sizing and fit of the left knee prosthesis with good tracking and no lucencies or abnormal problems with the bones. The right knee has joint space narrowing and subchondral sclerosis mainly in the medial and the patellofemoral joint. No fractures or other bone abnormalities are noted      Assessment:  Stable left total knee replacement right knee synovitis and possible medial meniscus tear    Plan:  During today's visit, there was approximately 25 minutes of face-to-face discussion in regards to the patient's current condition and treatment options.  More than 50 % of the time was counseling and coordination of care as indicated above. We talked about short and long-term expectations and with her consent she was injected with 1 cc Kenalog and 2 cc of Marcaine into the right knee joint.   She tolerated well we went through stretching strengthening exercise program she will follow-up with us in 4 to 6 weeks if she continues to significant pain we would consider getting an MRI at that point      PROCEDURE NOTE:   Cortisone shot Home exercise program and we reviewed the x-rays      They will schedule a follow up in 4 weeks as needed

## 2022-06-16 ENCOUNTER — TELEPHONE (OUTPATIENT)
Dept: ENT CLINIC | Age: 62
End: 2022-06-16

## 2022-06-16 DIAGNOSIS — H71.21 CHOLESTEATOMA OF RIGHT MASTOID: Primary | ICD-10-CM

## 2022-06-16 NOTE — TELEPHONE ENCOUNTER
Patient is calling in about a referral to . Patient stated that Dr. Joauqin Alexis said he would send a referral over and they would call and get her scheduled, but she has not heard from their office. I looked into her chart and I do not see the referral for anyone at , either.

## 2022-06-22 ENCOUNTER — TELEPHONE (OUTPATIENT)
Dept: ENT CLINIC | Age: 62
End: 2022-06-22

## 2022-06-22 NOTE — TELEPHONE ENCOUNTER
Pt.calling again asking about her referral to UC she states  does not have the referral and she can not make that apt. Until  send it over to .

## 2022-06-22 NOTE — TELEPHONE ENCOUNTER
Please call patient and advised her that I gave her name and number and brief history to Dr. Megan Escobar and his office should be calling her to set up an appointment for evaluations.

## 2022-06-28 NOTE — TELEPHONE ENCOUNTER
External referral sent to Dr. Adams Cline for neurotology consultation. In addition Dr. Adams Cline  was given the patient's information in his office will call the patient to schedule an appointment for evaluation.

## 2022-09-01 DIAGNOSIS — Z79.1 ENCOUNTER FOR LONG-TERM (CURRENT) USE OF NON-STEROIDAL ANTI-INFLAMMATORIES: ICD-10-CM

## 2022-09-01 DIAGNOSIS — Q89.8 STICKLER SYNDROME: ICD-10-CM

## 2022-09-01 RX ORDER — ETODOLAC 400 MG/1
TABLET, FILM COATED ORAL
Qty: 180 TABLET | Refills: 1 | Status: SHIPPED | OUTPATIENT
Start: 2022-09-01 | End: 2022-10-04 | Stop reason: CLARIF

## 2022-09-01 RX ORDER — DULOXETIN HYDROCHLORIDE 20 MG/1
CAPSULE, DELAYED RELEASE ORAL
Qty: 90 CAPSULE | Refills: 1 | Status: SHIPPED | OUTPATIENT
Start: 2022-09-01

## 2022-09-01 NOTE — TELEPHONE ENCOUNTER
Last appnt and labs 3/24/22  Next appnt 9/27/22  Last filled both 1/10/22 for 3 month supply with 1 refill

## 2022-09-27 DIAGNOSIS — I10 HYPERTENSION, UNSPECIFIED TYPE: ICD-10-CM

## 2022-09-27 LAB — TSH REFLEX: 1.32 UIU/ML (ref 0.27–4.2)

## 2022-09-29 DIAGNOSIS — I10 HYPERTENSION, UNSPECIFIED TYPE: ICD-10-CM

## 2022-09-29 LAB — CORTISOL - AM: 22.9 UG/DL (ref 4.3–22.4)

## 2022-09-30 DIAGNOSIS — I10 HYPERTENSION, UNSPECIFIED TYPE: ICD-10-CM

## 2022-09-30 LAB — CORTISOL - AM: 8.3 UG/DL (ref 4.3–22.4)

## 2022-10-01 LAB — ALDOSTERONE: 24.9 NG/DL

## 2022-10-03 LAB
METANEPH/PLASMA INTERP: ABNORMAL
METANEPHRINE FREE PLASMA: 0.17 NMOL/L (ref 0–0.49)
NORMETANEPHRINE FREE PLASMA: 1.63 NMOL/L (ref 0–0.89)

## 2022-10-04 PROBLEM — R07.82 INTERCOSTAL PAIN: Status: RESOLVED | Noted: 2017-03-27 | Resolved: 2022-10-04

## 2022-10-04 PROBLEM — M19.132 SCAPHOLUNATE ADVANCED COLLAPSE OF LEFT WRIST: Status: RESOLVED | Noted: 2020-10-18 | Resolved: 2022-10-04

## 2022-10-04 PROBLEM — H91.93 HEARING LOSS OF BOTH EARS: Status: RESOLVED | Noted: 2017-05-01 | Resolved: 2022-10-04

## 2022-10-04 LAB — RENIN ACTIVITY: 4.3 NG/ML/HR

## 2022-10-11 ENCOUNTER — OFFICE VISIT (OUTPATIENT)
Dept: RHEUMATOLOGY | Age: 62
End: 2022-10-11
Payer: COMMERCIAL

## 2022-10-11 VITALS
DIASTOLIC BLOOD PRESSURE: 88 MMHG | HEART RATE: 80 BPM | HEIGHT: 62 IN | SYSTOLIC BLOOD PRESSURE: 144 MMHG | WEIGHT: 158 LBS | BODY MASS INDEX: 29.08 KG/M2

## 2022-10-11 DIAGNOSIS — Q89.8 STICKLER SYNDROME: Primary | ICD-10-CM

## 2022-10-11 PROCEDURE — 3017F COLORECTAL CA SCREEN DOC REV: CPT | Performed by: INTERNAL MEDICINE

## 2022-10-11 PROCEDURE — G8484 FLU IMMUNIZE NO ADMIN: HCPCS | Performed by: INTERNAL MEDICINE

## 2022-10-11 PROCEDURE — 99213 OFFICE O/P EST LOW 20 MIN: CPT | Performed by: INTERNAL MEDICINE

## 2022-10-11 PROCEDURE — G8417 CALC BMI ABV UP PARAM F/U: HCPCS | Performed by: INTERNAL MEDICINE

## 2022-10-11 PROCEDURE — G8427 DOCREV CUR MEDS BY ELIG CLIN: HCPCS | Performed by: INTERNAL MEDICINE

## 2022-10-11 PROCEDURE — 1036F TOBACCO NON-USER: CPT | Performed by: INTERNAL MEDICINE

## 2022-10-11 NOTE — PROGRESS NOTES
Subjective: Gumaro Cordoba is a 63 y.o. female the patient returns for follow-up of Stickler syndrome. She had routine follow-up with her hearing loss and was found to have a large mass in her ear. She is scheduled for surgery tomorrow at [facility]. In the interim she was also found to have elevated blood pressure and is discontinued Lodine. Her blood pressure when she was left in the office was within normal limits but at other visits to the ENT found to have elevated diastolic blood pressure. She also relates that she had visual changes and headaches which have improved since she has discontinued Lodine      Review of Systems:    Complains of pain in her hands. Diclofenac ointment not that helpful    Objective:     BP (!) 144/88   Pulse 80   Ht 5' 2\" (1.575 m)   Wt 158 lb (71.7 kg)   LMP 12/01/2011   BMI 28.90 kg/m²   Alert female in no acute distress. Prominent osteoarthritic changes DIPs and PIPs    Assessment:    Osteoarthritis      Plan:      The patient will have surgery and then contact the office. She is reluctant to try any nonsteroidal or any dose of nonsteroidal because of her blood pressure. We will consider using tramadol.   Arrangements for follow-up will be made once she is released from her surgeon        Karen Page MD, MD, 10/11/2022 12:22 PM

## 2022-10-17 DIAGNOSIS — I10 PRIMARY HYPERTENSION: ICD-10-CM

## 2022-10-17 LAB
ANION GAP SERPL CALCULATED.3IONS-SCNC: 14 MMOL/L (ref 3–16)
BUN BLDV-MCNC: 20 MG/DL (ref 7–20)
CALCIUM SERPL-MCNC: 10.3 MG/DL (ref 8.3–10.6)
CHLORIDE BLD-SCNC: 98 MMOL/L (ref 99–110)
CO2: 27 MMOL/L (ref 21–32)
CREAT SERPL-MCNC: 0.8 MG/DL (ref 0.6–1.1)
GFR AFRICAN AMERICAN: >60
GFR NON-AFRICAN AMERICAN: >60
GLUCOSE BLD-MCNC: 114 MG/DL (ref 70–99)
POTASSIUM SERPL-SCNC: 4 MMOL/L (ref 3.5–5.1)
SODIUM BLD-SCNC: 139 MMOL/L (ref 136–145)

## 2022-10-21 LAB
METANEPH/PLASMA INTERP: ABNORMAL
METANEPHRINE FREE PLASMA: 0.16 NMOL/L (ref 0–0.49)
NORMETANEPHRINE FREE PLASMA: 1.25 NMOL/L (ref 0–0.89)

## 2022-10-25 DIAGNOSIS — R79.89 ELEVATED PLASMA METANEPHRINES: ICD-10-CM

## 2022-10-29 LAB
CATE U INT: ABNORMAL
CREATININE 24 HOUR URINE: 768 MG/D (ref 500–1400)
CREATININE URINE: 64 MG/DL
DOPAMINE (G CRT): 150 UG/G CRT (ref 0–250)
DOPAMINE 24 HOUR URINE: 115 UG/D (ref 71–485)
DOPAMINE, (UG/L): 96 UG/L
EPINEPHRINE (G CRT): 3 UG/G CRT (ref 0–20)
EPINEPHRINE 24 HOUR URINE: 2 UG/D (ref 1–14)
EPINEPHRINE, (UG/L): 2 UG/L
HOURS COLLECTED: 24
METANEPHRINE INTREP URINE: NORMAL
METANEPHRINE UG/G CRE: 59 UG/G CRT (ref 0–300)
METANEPHRINE, UR-PER VOL: 38 UG/L
METANEPHRINES URINE: 46 UG/D (ref 36–229)
NOREPINEPH (G CRT): 53 UG/G CRT (ref 0–45)
NOREPINEPHRINE 24 HOUR URINE: 41 UG/D (ref 14–120)
NOREPINEPHRINE, (UG/L): 34 UG/L
NORMETANEPHRINE 24 HOUR URINE: 281 UG/D (ref 95–650)
NORMETANEPHRINE, (G/CRT): 366 UG/G CRT (ref 0–400)
NORMETANEPHRINES, NMOL/L: 234 UG/L
URINE TOTAL VOLUME: 1200
VMA INTERPRETATION: NORMAL
VMA URINE MG/ML: 2.7 MG/L
VMA, UR/G CRT: 4 MG/GCR (ref 0–6)
VMA-MG/D: 3.2 MG/D (ref 0–7)

## 2022-11-17 ENCOUNTER — HOSPITAL ENCOUNTER (OUTPATIENT)
Dept: CT IMAGING | Age: 62
Discharge: HOME | End: 2022-11-17
Payer: COMMERCIAL

## 2022-11-17 DIAGNOSIS — I10 HYPERTENSION, UNSPECIFIED TYPE: ICD-10-CM

## 2022-11-17 PROCEDURE — 6360000004 HC RX CONTRAST MEDICATION: Performed by: INTERNAL MEDICINE

## 2022-11-17 PROCEDURE — 74177 CT ABD & PELVIS W/CONTRAST: CPT

## 2022-11-17 RX ADMIN — IOPAMIDOL 50 ML: 612 INJECTION, SOLUTION INTRAVENOUS at 17:01

## 2022-11-17 RX ADMIN — IOPAMIDOL 75 ML: 755 INJECTION, SOLUTION INTRAVENOUS at 17:20

## 2022-11-21 ENCOUNTER — OFFICE VISIT (OUTPATIENT)
Dept: SLEEP MEDICINE | Age: 62
End: 2022-11-21
Payer: COMMERCIAL

## 2022-11-21 VITALS
HEIGHT: 65 IN | HEART RATE: 73 BPM | TEMPERATURE: 97.5 F | SYSTOLIC BLOOD PRESSURE: 100 MMHG | DIASTOLIC BLOOD PRESSURE: 60 MMHG | OXYGEN SATURATION: 98 % | WEIGHT: 162.6 LBS | RESPIRATION RATE: 18 BRPM | BODY MASS INDEX: 27.09 KG/M2

## 2022-11-21 DIAGNOSIS — R06.81 WITNESSED EPISODE OF APNEA: ICD-10-CM

## 2022-11-21 DIAGNOSIS — R06.83 SNORING: ICD-10-CM

## 2022-11-21 DIAGNOSIS — I10 PRIMARY HYPERTENSION: ICD-10-CM

## 2022-11-21 DIAGNOSIS — Z87.730 H/O CLEFT PALATE: ICD-10-CM

## 2022-11-21 DIAGNOSIS — G47.33 OSA (OBSTRUCTIVE SLEEP APNEA): Primary | ICD-10-CM

## 2022-11-21 LAB
ANION GAP SERPL CALCULATED.3IONS-SCNC: 11 MMOL/L (ref 3–16)
BUN BLDV-MCNC: 22 MG/DL (ref 7–20)
CALCIUM SERPL-MCNC: 9.4 MG/DL (ref 8.3–10.6)
CHLORIDE BLD-SCNC: 105 MMOL/L (ref 99–110)
CO2: 27 MMOL/L (ref 21–32)
CREAT SERPL-MCNC: 0.6 MG/DL (ref 0.6–1.2)
GFR SERPL CREATININE-BSD FRML MDRD: >60 ML/MIN/{1.73_M2}
GLUCOSE BLD-MCNC: 102 MG/DL (ref 70–99)
POTASSIUM SERPL-SCNC: 4.6 MMOL/L (ref 3.5–5.1)
SODIUM BLD-SCNC: 143 MMOL/L (ref 136–145)

## 2022-11-21 PROCEDURE — 1036F TOBACCO NON-USER: CPT | Performed by: PSYCHIATRY & NEUROLOGY

## 2022-11-21 PROCEDURE — 99204 OFFICE O/P NEW MOD 45 MIN: CPT | Performed by: PSYCHIATRY & NEUROLOGY

## 2022-11-21 PROCEDURE — G8417 CALC BMI ABV UP PARAM F/U: HCPCS | Performed by: PSYCHIATRY & NEUROLOGY

## 2022-11-21 PROCEDURE — 3074F SYST BP LT 130 MM HG: CPT | Performed by: PSYCHIATRY & NEUROLOGY

## 2022-11-21 PROCEDURE — G8427 DOCREV CUR MEDS BY ELIG CLIN: HCPCS | Performed by: PSYCHIATRY & NEUROLOGY

## 2022-11-21 PROCEDURE — G8484 FLU IMMUNIZE NO ADMIN: HCPCS | Performed by: PSYCHIATRY & NEUROLOGY

## 2022-11-21 PROCEDURE — 3017F COLORECTAL CA SCREEN DOC REV: CPT | Performed by: PSYCHIATRY & NEUROLOGY

## 2022-11-21 PROCEDURE — 3078F DIAST BP <80 MM HG: CPT | Performed by: PSYCHIATRY & NEUROLOGY

## 2022-11-21 RX ORDER — LABETALOL 100 MG/1
100 TABLET, FILM COATED ORAL 2 TIMES DAILY
COMMUNITY

## 2022-11-21 ASSESSMENT — SLEEP AND FATIGUE QUESTIONNAIRES
HOW LIKELY ARE YOU TO NOD OFF OR FALL ASLEEP WHILE SITTING QUIETLY AFTER LUNCH WITHOUT ALCOHOL: 0
HOW LIKELY ARE YOU TO NOD OFF OR FALL ASLEEP WHILE SITTING AND READING: 0
HOW LIKELY ARE YOU TO NOD OFF OR FALL ASLEEP WHILE SITTING INACTIVE IN A PUBLIC PLACE: 0
HOW LIKELY ARE YOU TO NOD OFF OR FALL ASLEEP WHILE WATCHING TV: 0
HOW LIKELY ARE YOU TO NOD OFF OR FALL ASLEEP IN A CAR, WHILE STOPPED FOR A FEW MINUTES IN TRAFFIC: 0
HOW LIKELY ARE YOU TO NOD OFF OR FALL ASLEEP WHILE SITTING AND TALKING TO SOMEONE: 0
NECK CIRCUMFERENCE (INCHES): 13
ESS TOTAL SCORE: 1
HOW LIKELY ARE YOU TO NOD OFF OR FALL ASLEEP WHILE LYING DOWN TO REST IN THE AFTERNOON WHEN CIRCUMSTANCES PERMIT: 1
HOW LIKELY ARE YOU TO NOD OFF OR FALL ASLEEP WHEN YOU ARE A PASSENGER IN A CAR FOR AN HOUR WITHOUT A BREAK: 0

## 2022-11-21 ASSESSMENT — ENCOUNTER SYMPTOMS
GASTROINTESTINAL NEGATIVE: 1
APNEA: 1
ALLERGIC/IMMUNOLOGIC NEGATIVE: 1
EYES NEGATIVE: 1

## 2022-11-21 NOTE — PATIENT INSTRUCTIONS
Orders Placed This Encounter   Procedures    Home Sleep Study     Standing Status:   Future     Standing Expiration Date:   11/21/2023     Order Specific Question:   Location For Sleep Study     Answer:   [place]     Order Specific Question:   Select Sleep Lab Location     Answer:   [facility]    Sleep Study with PAP Titration     Standing Status:   Future     Standing Expiration Date:   11/21/2023     Order Specific Question:   Sleep Study Titration Type     Answer:   CPAP     Order Specific Question:   Location For Sleep Study     Answer:   [place]     Order Specific Question:   Select Sleep Lab Location     Answer:   [facility]

## 2022-11-21 NOTE — PROGRESS NOTES
MD ESTRELLA Barnett Board Certified in Sleep Medicine  Certified [facility]  Board Certified in Neurology [address] [facility] [address] [place]ine [facility], [address]  [address], [address]           [address] [address] [phone]    Subjective:     Patient ID: Waqar Rosado is a 63 y.o. female. Chief Complaint   Patient presents with    Establish Care    Snoring    Hypertension         HPI:        Waqar Rosado is a 63 y.o. female referred by Dr. Maritza Shoemaker for a sleep evaluation. She complains of snoring, periods of not breathing, tossing and turning, kicking but she denies snorting, choking, knees buckling with laughing, completely or partially paralyzed while falling asleep or waking up, take naps during the day, noisy environment, uncomfortable room temperature, uncomfortable bedding. Symptoms began a few years ago, gradually worsening since that time. The patient's bed-partner confirmed the snoring and stopped breathing at night. SLEEP SCHEDULE: Goes to bed around 9:30 PM in the weekdays and 9:30 PM in the weekends. It usually takes the patient 20 minutes to fall asleep. The patient gets up 0-3 per night to go to the bathroom. The Patient finally gets up at 7 AM during the weekdays and 7 AM in the weekends. patient wakes up with dry mouth. The patient has restless sleep with frequent arousals in addition to the Patient has significant daytime sleepiness. The Patient scored Strafford Sleepiness Score: 1 on Strafford Sleepiness Scale ( more than 10 is indicative of daytime sleepiness)and 23 in fatigue scale ( more than 36 is indicative of daytime fatigue). The patient takes no naps. Previous evaluation and treatment has included- none.     DOT/CDL - N/A  FAA/'maya - N/A  The patient HTN is stable  with the Labetalol. Previous Report(s) Reviewed: historical medical records       Social History     Socioeconomic History    Marital status:      Spouse name: Not on file    Number of children: Not on file    Years of education: Not on file    Highest education level: Not on file   Occupational History    Occupation: Pharmacy, IV technician   Tobacco Use    Smoking status: Never     Passive exposure: Past    Smokeless tobacco: Never   Vaping Use    Vaping Use: Never used   Substance and Sexual Activity    Alcohol use: No    Drug use: No    Sexual activity: Yes     Partners: Male   Other Topics Concern    Not on file   Social History Narrative    Not on file     Social Determinants of Health     Financial Resource Strain: Low Risk     Difficulty of Paying Living Expenses: Not hard at all   Food Insecurity: No Food Insecurity    Worried About Running Out of Food in the Last Year: Never true    Ran Out of Food in the Last Year: Never true   Transportation Needs: Not on file   Physical Activity: Not on file   Stress: Not on file   Social Connections: Not on file   Intimate Partner Violence: Not on file   Housing Stability: Not on file       Prior to Admission medications    Medication Sig Start Date End Date Taking?  Authorizing Provider   labetalol (NORMODYNE) 100 MG tablet Take 100 mg by mouth 2 times daily   Yes Historical Provider, MD   DULoxetine (CYMBALTA) 20 MG extended release capsule TAKE [address]  Patient taking differently: 20 mg Takes 2 days a week 9/1/22  Yes Florencia Larson MD   Multiple Vitamins-Minerals (CENTRUM SILVER 50+WOMEN) TABS Take by mouth daily   Yes Historical Provider, MD   Misc Natural Products (OSTEO BI-FLEX ADV TRIPLE ST) TABS Take by mouth   Yes Historical Provider, MD       Allergies as of 11/21/2022 - Fully Reviewed 11/21/2022   Allergen Reaction Noted    Morphine Other (See Comments) 12/05/2009 Vicodin [hydrocodone-acetaminophen] Nausea Only 12/09/2011    Percocet [oxycodone-acetaminophen] Nausea Only 01/26/2017       Patient Active Problem List   Diagnosis    Stickler syndrome    Primary hypertension    Chronic tubotympanic suppurative otitis media of right ear    Mixed hearing loss    Sensorineural hearing loss (SNHL) of left ear with restricted hearing of right ear    Subjective tinnitus of both ears    Trigger middle finger of right hand    Otalgia, right ear    S/P total knee arthroplasty, left 3.25.11    Effusion of left knee       Past Medical History:   Diagnosis Date    Arthritis     lt knee/hands    Hypertension     Right knee pain     Stickler syndrome        Past Surgical History:   Procedure Laterality Date    CLEFT PALATE REPAIR  1964 and [address]  12/9/11    HYSTEROSCOPY,NOVASURE ABLATION    MANDIBLE SURGERY  1990    OVARY REMOVAL      and cyst    TOTAL KNEE ARTHROPLASTY  3/25/11    Left       Family History   Problem Relation Age of Onset    Arthritis Mother     Heart Disease Father     Hypertension Father     Cancer Maternal Grandmother        Review of Systems   Constitutional: Negative. HENT:  Positive for hearing loss. Eyes: Negative. Respiratory:  Positive for apnea. Cardiovascular: Negative. Gastrointestinal: Negative. Endocrine: Negative. Genitourinary:  Positive for frequency. Musculoskeletal:  Positive for arthralgias and myalgias. Allergic/Immunologic: Negative. Neurological:  Positive for headaches. Hematological: Negative. Psychiatric/Behavioral: Negative. Objective:     Vitals:  Weight BMI Neck circumference    Wt Readings from Last 3 Encounters:   11/21/22 162 lb 9.6 oz (73.8 kg)   11/17/22 159 lb (72.1 kg)   10/21/22 158 lb (71.7 kg)    Body mass index is 27.48 kg/m².  Neck circumference (Inches): 13     BP HR SaO2   BP Readings from Last 3 Encounters:   11/21/22 100/60   11/17/22 128/86   10/21/22 124/86 Pulse Readings from Last 3 Encounters:   11/21/22 73   11/17/22 85   10/21/22 86    SpO2 Readings from Last 3 Encounters:   11/21/22 98%   11/17/22 98%   10/21/22 96%        The mandibular molar Class :   []1 []2 []3      Mallampati I Airway Classification:   []1 []2 []3 []4        Physical Exam  Vitals and nursing note reviewed. Constitutional:       Appearance: Normal appearance. HENT:      Head: Atraumatic. Nose: Nose normal.      Mouth/Throat:      Comments: Mallampati class 1, no retrognathia or hypognathia , normal airflow in bilateral nostrils, no septum deviation , cleft palate, no tonsils enlargement. Eyes:      Extraocular Movements: Extraocular movements intact. Cardiovascular:      Rate and Rhythm: Normal rate and regular rhythm. Pulmonary:      Effort: Pulmonary effort is normal.      Breath sounds: Normal breath sounds. Musculoskeletal:         General: Normal range of motion. Neurological:      Mental Status: Mental status is at baseline. Psychiatric:         Mood and Affect: Mood normal.       Assessment:    Obstructive sleep apnea especially with snoring, observed apnea, cleft palate. Diagnosis Orders   1. LEONID (obstructive sleep apnea)  Home Sleep Study    Sleep Study with PAP Titration      2. Primary hypertension        3. H/O cleft palate        4. Snoring        5. Witnessed episode of apnea          Plan:     Patient was counseled about the pathophysiology of obstructive sleep apnea syndrome and the methods for evaluating its presence and severity. Patient was counseled to avoid driving and other potentially hazardous circumstances if the patient is experiencing excessive sleepiness.   Treatment considerations include the use of nasal CPAP, oral dental appliance or a surgical intervention, which should be based on otolarygologic findings, In the meantime, the patient should be cautioned to avoid the use of alcohol or other depressant medications because of potential for increasing the duration and severity of apnea and cautioned regarding driving or operating and dangerous equipment if the patient is experiencing daytime sleepiness. .        Orders Placed This Encounter   Procedures    Home Sleep Study    Sleep Study with PAP Titration         Return for F/U between 31 and 90 days from the recieving CPAP.     Pedro Robledo MD  Medical Director - [facility]

## 2022-12-02 ENCOUNTER — HOSPITAL ENCOUNTER (OUTPATIENT)
Dept: SLEEP CENTER | Age: 62
Discharge: HOME | End: 2022-12-02

## 2022-12-02 DIAGNOSIS — G47.33 OSA (OBSTRUCTIVE SLEEP APNEA): ICD-10-CM

## 2022-12-05 PROBLEM — G47.33 OSA (OBSTRUCTIVE SLEEP APNEA): Status: ACTIVE | Noted: 2022-12-05

## 2022-12-06 ENCOUNTER — TELEPHONE (OUTPATIENT)
Dept: PULMONOLOGY | Age: 62
End: 2022-12-06

## 2022-12-06 NOTE — TELEPHONE ENCOUNTER
Left message for patient to call re: Sleep study showed moderate LEONID. AHI was 15.7  per hr. And O2 Desaturations to 87%. Dr Rayo Hoyos titration.

## 2022-12-06 NOTE — TELEPHONE ENCOUNTER
Patient calls back and states she doesn't think she can tolerate a CPAP and is refusing to have a titration. She said 87% is close enough to 90%.

## 2022-12-16 ENCOUNTER — OFFICE VISIT (OUTPATIENT)
Dept: ORTHOPEDIC SURGERY | Age: 62
End: 2022-12-16

## 2022-12-16 VITALS — HEIGHT: 64 IN | WEIGHT: 159 LBS | BODY MASS INDEX: 27.14 KG/M2

## 2022-12-16 DIAGNOSIS — M17.11 PRIMARY OSTEOARTHRITIS OF RIGHT KNEE: Primary | ICD-10-CM

## 2022-12-16 RX ORDER — TRIAMCINOLONE ACETONIDE 40 MG/ML
40 INJECTION, SUSPENSION INTRA-ARTICULAR; INTRAMUSCULAR ONCE
Status: COMPLETED | OUTPATIENT
Start: 2022-12-16 | End: 2022-12-16

## 2022-12-16 RX ORDER — BUPIVACAINE HYDROCHLORIDE 2.5 MG/ML
2 INJECTION, SOLUTION INFILTRATION; PERINEURAL ONCE
Status: COMPLETED | OUTPATIENT
Start: 2022-12-16 | End: 2022-12-16

## 2022-12-16 RX ADMIN — BUPIVACAINE HYDROCHLORIDE 5 MG: 2.5 INJECTION, SOLUTION INFILTRATION; PERINEURAL at 10:16

## 2022-12-16 RX ADMIN — TRIAMCINOLONE ACETONIDE 40 MG: 40 INJECTION, SUSPENSION INTRA-ARTICULAR; INTRAMUSCULAR at 10:17

## 2022-12-16 NOTE — PROGRESS NOTES
[facility]  Office Visit    Chief Complaint: Right knee pain    HPI:  Giuseppe Mendoza is a 63 y.o. who is here in follow-up of right knee pain. She was last seen by Alissa Llamas on June 13, 2022. She returns today with continued right knee pain. She does have history of left total knee arthroplasty with Dr. Saud Hoskins in 2011 and the left knee is doing well. 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 She is now not following as often but continues to have right knee pain. The pain is throughout her knee and is intermittent in nature. She has pain along the medial joint line especially. She denies right hip pain. She walks without assistive device. She rates the pain as up to 7/10. Patient Active Problem List   Diagnosis    Stickler syndrome    Primary hypertension    Chronic tubotympanic suppurative otitis media of right ear    Mixed hearing loss    Sensorineural hearing loss (SNHL) of left ear with restricted hearing of right ear    Subjective tinnitus of both ears    Trigger middle finger of right hand    Otalgia, right ear    S/P total knee arthroplasty, left 3.25.11    Effusion of left knee    LEONID (obstructive sleep apnea)       ROS:  Constitutional: denies fever, chills, weight loss  MSK: denies pain in other joints, muscle aches  Neurological: denies numbness, tingling, weakness    Exam:  Height 5' 4\" (1.626 m), weight 159 lb (72.1 kg)    Appearance: sitting in exam room chair, appears to be in no acute distress, awake and alert  Resp: unlabored breathing on room air  Skin: warm, dry and intact with out erythema or significant increased temperature  Neuro: grossly intact both lower extremities. Intact sensation to light touch. Motor exam 4+ to 5/5 in all major motor groups. Right knee: Negative Stinchfield at the hip. Examination reveals that knee range of motion is 0 to 130 degrees.   There is varus deformity, positive crepitus, positive joint line tenderness, positive antalgic gait. Neurologically, plantar flexion and dorsiflexion is intact. 5/5 strength. Imaging:  Prior right knee radiographs reviewed are significant for joint space narrowing of the medial compartment as well as bone-on-bone arthritis of the patellofemoral joint. Assessment:  Right knee osteoarthritis    Plan:  We discussed the diagnosis and treatment options. We discussed continued treatment with NSAIDs, steroid injection, repeat viscosupplementation injection, total knee arthroplasty. She is interested today in a right knee steroid injection and this was performed as described below. She will otherwise continue activity as tolerated and follow-up in 3 months for repeat assessment and possible repeat injection versus surgical discussion. PROCEDURE NOTE:  After verbal consent was obtained, the patient's right knee was prepped with alcohol. Skin was anesthetized with ethyl chloride. The knee was then injected under sterile technique with 2mL of 0.25% marcaine and 1 mL of 40 mg/mL Kenalog. A bandage was applied. The patient tolerated the procedure well and there were no complications. Total time spent on today's encounter was at least 24 minutes. This time included reviewing prior notes, radiographs, and lab results when available, reviewing history obtained by medical assistant, performing history and physical exam, reviewing tests/radiographs with the patient, counseling the patient, ordering medications or tests, documentation in the electronic health record, and coordination of care. This dictation was done with Dragon dictation and may contain mechanical errors related to translation.

## 2023-02-03 ENCOUNTER — TELEPHONE (OUTPATIENT)
Dept: ORTHOPEDIC SURGERY | Age: 63
End: 2023-02-03

## 2023-02-03 DIAGNOSIS — M17.11 PRIMARY OSTEOARTHRITIS OF RIGHT KNEE: Primary | ICD-10-CM

## 2023-02-03 NOTE — TELEPHONE ENCOUNTER
Other PATIENT IS CALLING REQUESTING A CALL BACK. PATIENT SAYS CORTISONE INJECTION HAS WORN OFF AND WOULD LIKE TO KNOW IF SHE CAN GET A GEL INJECTION.  [address] [phone]

## 2023-02-10 ENCOUNTER — TELEPHONE (OUTPATIENT)
Dept: ORTHOPEDIC SURGERY | Age: 63
End: 2023-02-10

## 2023-02-10 NOTE — TELEPHONE ENCOUNTER
I spoke with patient and she said she had a left total Knee done so the Euflexxa injection was only for the Rt Knee.  She will start her injections on 2/16/23

## 2023-02-16 ENCOUNTER — OFFICE VISIT (OUTPATIENT)
Dept: ORTHOPEDIC SURGERY | Age: 63
End: 2023-02-16

## 2023-02-16 VITALS — HEIGHT: 64 IN | RESPIRATION RATE: 16 BRPM | BODY MASS INDEX: 27.14 KG/M2 | WEIGHT: 159 LBS

## 2023-02-16 DIAGNOSIS — M17.11 PRIMARY OSTEOARTHRITIS OF RIGHT KNEE: Primary | ICD-10-CM

## 2023-02-16 RX ORDER — HYALURONATE SODIUM 10 MG/ML
20 SYRINGE (ML) INTRAARTICULAR ONCE
Status: COMPLETED | OUTPATIENT
Start: 2023-02-16 | End: 2023-02-16

## 2023-02-16 RX ADMIN — Medication 20 MG: at 10:37

## 2023-02-16 NOTE — PROGRESS NOTES
This dictation was done with [employer] dictation and may contain mechanical errors related to translation. Resp. rate 16, height 5' 4\" (1.626 m), weight 159 lb (72.1 kg), last menstrual period 12/01/2011, not currently breastfeeding. This is a very pleasant 71-year-old female who is here for the osteoarthritis in her right knee. She says at times it can be an 8 out of 10 pain. Subjective:  right knee pain. She is here for the osteoarthritis in the knee. After discussion examination we decided to proceed with the 1st Euflexxa  injection for the  right knee(s). Objective:   Resp. rate 16, height 5' 4\" (1.626 m), weight 159 lb (72.1 kg), last menstrual period 12/01/2011, not currently breastfeeding. There is a milld joint effusion noted of the right knee(s). There is moderate pain with range of motion testing. There is no significant  instability noted. Neuro exam grossly intact both lower extremities. Intact sensation to light touch. Motor exam 4+ to 5/5 in all major motor groups. Negative Javed's sign. Skin is warm, dry and intact with out erythema or significant increased temperature around the knee joint(s). Assessment:  Degenerative Joint Disease of the right Knee(s). Plan:  Discussed  injections including all  risks and benefits including increased pain, drug reaction, infection, bleeding, lack of improvement and  neurovascular injury. All the questions were answered. PROCEDURE NOTE:  PRE-PROCEDURE DIAGNOSIS: DJD knee  POST-PROCEDURE DIAGNOSIS: DJD knee    With the patient's permission, her right knee was prepped in standard sterile fashion with  Alcohol. The prefilled injection of the preferred Eufflexxa was injected into the right lateral joint space compartment without difficulty. The patient tolerated the procedure(s) well without difficulty. A band-aid was applied.      POST-PROCEDURE INSTRUCTIONS GIVEN TO PATIENT: The patient was advised to ice the knee for 15-20 minutes to relieve any injection site related pain. Decrease activity for the next 24 to 48 hours. May use prescription or OTC pain relievers as needed      FOLLOW-UP:   As directed or call or return to clinic if these symptoms worsen or fail to improve as anticipated. If at any time you are concerned you may contact the office for further instructions or care.

## 2023-02-23 ENCOUNTER — OFFICE VISIT (OUTPATIENT)
Dept: ORTHOPEDIC SURGERY | Age: 63
End: 2023-02-23

## 2023-02-23 VITALS — RESPIRATION RATE: 16 BRPM | HEIGHT: 64 IN | WEIGHT: 159 LBS | BODY MASS INDEX: 27.14 KG/M2

## 2023-02-23 DIAGNOSIS — M17.11 PRIMARY OSTEOARTHRITIS OF RIGHT KNEE: Primary | ICD-10-CM

## 2023-02-23 RX ORDER — HYALURONATE SODIUM 10 MG/ML
20 SYRINGE (ML) INTRAARTICULAR ONCE
Status: COMPLETED | OUTPATIENT
Start: 2023-02-23 | End: 2023-02-23

## 2023-02-23 RX ADMIN — Medication 20 MG: at 14:08

## 2023-02-23 NOTE — PROGRESS NOTES
[facility]  Office Visit    Chief Complaint: Right knee pain    HPI:  Waqar Rosado is a 63 y.o. who is here in follow-up of right knee pain. She came in last week for her first Euflexxa injection in the right knee. She reports minimal relief of symptoms since that time. She comes in today for the second injection. She denies adverse events. Exam:  Appearance: sitting in exam room chair, appears to be in no acute distress, awake and alert  Resp: unlabored breathing on room air  Skin: warm, dry and intact with out erythema or significant increased temperature  Neuro: grossly intact both lower extremities. Intact sensation to light touch. Motor exam 4+ to 5/5 in all major motor groups. Right knee: Negative Stinchfield at the hip. Examination reveals that knee range of motion is 0 to 130 degrees. There is varus deformity, positive crepitus, positive joint line tenderness, positive antalgic gait. Neurologically, plantar flexion and dorsiflexion is intact. 5/5 strength. Imaging:  Prior right knee radiographs reviewed are significant for joint space narrowing of the medial compartment as well as bone-on-bone arthritis of the patellofemoral joint. Assessment:  Right knee osteoarthritis    Plan:  She is here today for her second Euflexxa injection right knee. This was performed as described below. She will follow-up next week for the third injection. PROCEDURE NOTE:  After verbal consent was obtained, the patient's right knee was prepped with alcohol. Skin was anesthetized with ethyl chloride. The knee was then injected under sterile technique with the prepackaged dose of Euflexxa. A bandage was applied. The patient tolerated the procedure well and there were no complications. This dictation was done with [employer] dictation and may contain mechanical errors related to translation.

## 2023-03-02 ENCOUNTER — OFFICE VISIT (OUTPATIENT)
Dept: ORTHOPEDIC SURGERY | Age: 63
End: 2023-03-02
Payer: COMMERCIAL

## 2023-03-02 VITALS — WEIGHT: 154 LBS | HEIGHT: 64 IN | RESPIRATION RATE: 16 BRPM | BODY MASS INDEX: 26.29 KG/M2

## 2023-03-02 DIAGNOSIS — M25.559 HIP PAIN: ICD-10-CM

## 2023-03-02 DIAGNOSIS — M17.11 PRIMARY OSTEOARTHRITIS OF RIGHT KNEE: Primary | ICD-10-CM

## 2023-03-02 PROCEDURE — G8484 FLU IMMUNIZE NO ADMIN: HCPCS | Performed by: PHYSICIAN ASSISTANT

## 2023-03-02 PROCEDURE — 1036F TOBACCO NON-USER: CPT | Performed by: PHYSICIAN ASSISTANT

## 2023-03-02 PROCEDURE — G8417 CALC BMI ABV UP PARAM F/U: HCPCS | Performed by: PHYSICIAN ASSISTANT

## 2023-03-02 PROCEDURE — 99213 OFFICE O/P EST LOW 20 MIN: CPT | Performed by: PHYSICIAN ASSISTANT

## 2023-03-02 PROCEDURE — 3017F COLORECTAL CA SCREEN DOC REV: CPT | Performed by: PHYSICIAN ASSISTANT

## 2023-03-02 PROCEDURE — G8427 DOCREV CUR MEDS BY ELIG CLIN: HCPCS | Performed by: PHYSICIAN ASSISTANT

## 2023-03-02 RX ORDER — HYALURONATE SODIUM 10 MG/ML
20 SYRINGE (ML) INTRAARTICULAR ONCE
Status: COMPLETED | OUTPATIENT
Start: 2023-03-02 | End: 2023-03-02

## 2023-03-02 RX ORDER — OXYCODONE HYDROCHLORIDE 5 MG/1
5 TABLET ORAL EVERY 6 HOURS PRN
Qty: 3 TABLET | Refills: 0 | Status: SHIPPED | OUTPATIENT
Start: 2023-03-02 | End: 2023-03-04

## 2023-03-02 RX ORDER — OXYCODONE HYDROCHLORIDE 5 MG/1
5 TABLET ORAL EVERY 6 HOURS PRN
Qty: 28 TABLET | Refills: 0 | Status: SHIPPED | OUTPATIENT
Start: 2023-03-02 | End: 2023-03-02

## 2023-03-02 RX ADMIN — Medication 20 MG: at 10:38

## 2023-03-02 NOTE — PROGRESS NOTES
Subjective:    She  is here for visco supplementation injection # 3 for the right knee. She reports significant right knee pain for 24 to 48 hours post right knee viscosupplementation injections. History of left knee arthroplasty which is doing well. She reports no improvement of her right knee pain status post 2 Visco injections. She reports left groin pain. Objective:   Resp. rate 16, height 5' 4\" (1.626 m), weight 154 lb (69.9 kg), last menstrual period 12/01/2011, not currently breastfeeding. Examination of the right knee: Inspection of the skin reveals no unusual erythema. There is mild intra-articular effusion. There is moderate pain associated with ROM testing. Extensor Mechanism is intact. Examination of the left hip:  No discoloration, wounds or gross deformity. Greater trochanter/bursa is non-tender to palpation. Stinchfield resisted hip flexion test positive. Gait ( Nml, Antalgic, Trendelenberg)-antalgic. Examination of the right hip:  No discoloration, wounds or gross deformity. Greater trochanter/bursa is non-tender to palpation. Stinchfield resisted hip flexion test negative. Gait ( Nml, Antalgic, Trendelenberg)-antalgic. AP Pelvis   The alignment is anatomic. There are radiographic findings to suggest significant degenerative changes of the bilateral hips. There are no radiographic findings to suggest fracture or dislocation. Diagnosis:    ICD-10-CM    1. Primary osteoarthritis of right knee  M17.11 [id number] - FL DRAIN/INJECT LARGE JOINT/BURSA     sodium hyaluronate (EUFLEXXA, HYALGAN) injection 20 mg     oxyCODONE (ROXICODONE) 5 MG immediate release tablet     DISCONTINUED: oxyCODONE (ROXICODONE) 5 MG immediate release tablet      2.  Hip pain  M25.559 XR PELVIS (1-2 VIEWS)     oxyCODONE (ROXICODONE) 5 MG immediate release tablet     DISCONTINUED: oxyCODONE (ROXICODONE) 5 MG immediate release tablet          Assessment/ Plan:  Right knee pain related to knee arthritis. Bilateral hip arthritis. Discussed at length conservative treatment of knee symptoms/arthritis, according to AAOS Clinical Practice Guidelines:  Evidence for intra-articular hyaluronic acid injections (viscosupplementation) is not as strong, but may benefit a subset of patients who have failed other options. Informed patient viscosupplementation can be performed every 6 months as needed. Risks and benefits of viscosupplementation injections were also discussed today. Risks include but not limited to increased pain, bleeding, infection, failure to provide improvement, neurovascular injury. She had ample time for discussion and questions were answered to her satisfaction. She verbally consented to proceed with intra-articular injection today. Proceed with injection # 3 in the series of 3 injections for the  right knee. PROCEDURE NOTE:  PRE-PROCEDURE DIAGNOSIS: DJD knee  POST-PROCEDURE DIAGNOSIS: DJD knee  With Pebbles Olivia permission, her  right knee was prepped in standard sterile fashion with  Alcohol. The prefilled injection of the visco supplementation ( Euflexxa 20 mg/ 2 ML ) was injected into the  anterior-lateral joint space compartment without difficulty. she tolerated the procedure well without difficulty. A band-aid was applied. POST-PROCEDURE INSTRUCTIONS GIVEN TO PATIENT:   She was advised to ice the knee for 15-20 minutes to relieve any injection site related pain. Decrease activity for the next 24 to 48 hours. May use prescription or OTC pain relievers as needed. Percocet 5/325 #6. Controlled substances monitoring: possible medication side effects, risk of tolerance and/or dependence, and alternative treatments discussed and OARRS report reviewed today- activity consistent with treatment plan. FOLLOW-UP: 4-6 weeks. As directed or call or return to clinic if these symptoms worsen or fail to improve as anticipated.  If at any time you are concerned you may contact the office for further instructions or care. He Velazquez PA-C  Senior Physician Assistant  [facility]                                         Disclaimer: This note was generated with use of a verbal recognition program (DRAGON) and an attempt was made to check for errors. It is possible that there are still dictated errors within this office note. If so, please bring any significant errors to my attention for an addendum. All efforts were made to ensure that this office note is accurate.

## 2023-03-08 ENCOUNTER — TELEPHONE (OUTPATIENT)
Dept: ORTHOPEDIC SURGERY | Age: 63
End: 2023-03-08

## 2023-03-08 ENCOUNTER — OFFICE VISIT (OUTPATIENT)
Dept: ORTHOPEDIC SURGERY | Age: 63
End: 2023-03-08
Payer: COMMERCIAL

## 2023-03-08 VITALS — HEIGHT: 64 IN | BODY MASS INDEX: 26.29 KG/M2 | WEIGHT: 154 LBS

## 2023-03-08 DIAGNOSIS — M17.11 PRIMARY OSTEOARTHRITIS OF RIGHT KNEE: Primary | ICD-10-CM

## 2023-03-08 PROCEDURE — 99213 OFFICE O/P EST LOW 20 MIN: CPT | Performed by: PHYSICIAN ASSISTANT

## 2023-03-08 PROCEDURE — 20610 DRAIN/INJ JOINT/BURSA W/O US: CPT | Performed by: PHYSICIAN ASSISTANT

## 2023-03-08 PROCEDURE — G8417 CALC BMI ABV UP PARAM F/U: HCPCS | Performed by: PHYSICIAN ASSISTANT

## 2023-03-08 PROCEDURE — G8427 DOCREV CUR MEDS BY ELIG CLIN: HCPCS | Performed by: PHYSICIAN ASSISTANT

## 2023-03-08 PROCEDURE — 3017F COLORECTAL CA SCREEN DOC REV: CPT | Performed by: PHYSICIAN ASSISTANT

## 2023-03-08 PROCEDURE — 1036F TOBACCO NON-USER: CPT | Performed by: PHYSICIAN ASSISTANT

## 2023-03-08 PROCEDURE — G8484 FLU IMMUNIZE NO ADMIN: HCPCS | Performed by: PHYSICIAN ASSISTANT

## 2023-03-08 RX ORDER — BUPIVACAINE HYDROCHLORIDE 2.5 MG/ML
2 INJECTION, SOLUTION INFILTRATION; PERINEURAL ONCE
Status: COMPLETED | OUTPATIENT
Start: 2023-03-08 | End: 2023-03-08

## 2023-03-08 RX ORDER — TRIAMCINOLONE ACETONIDE 40 MG/ML
40 INJECTION, SUSPENSION INTRA-ARTICULAR; INTRAMUSCULAR ONCE
Status: COMPLETED | OUTPATIENT
Start: 2023-03-08 | End: 2023-03-08

## 2023-03-08 RX ADMIN — BUPIVACAINE HYDROCHLORIDE 5 MG: 2.5 INJECTION, SOLUTION INFILTRATION; PERINEURAL at 14:15

## 2023-03-08 RX ADMIN — TRIAMCINOLONE ACETONIDE 40 MG: 40 INJECTION, SUSPENSION INTRA-ARTICULAR; INTRAMUSCULAR at 14:16

## 2023-03-09 PROBLEM — M17.11 PRIMARY OSTEOARTHRITIS OF RIGHT KNEE: Status: ACTIVE | Noted: 2023-03-09

## 2023-03-09 NOTE — PROGRESS NOTES
Subjective:      Patient ID: Ya Dyer is a 63 y.o. female who is here for follow up evaluation of continued severe right knee pain. She completed her viscosupplementation injections on 3/2/2023. No relief of knee symptoms post viscosupplementation injections. Review Of Systems:   Negative for fever or chills. Past Medical History:   Diagnosis Date    Arthritis     lt knee/hands    Hypertension     Stickler syndrome     Cleft palate, hearing loss, arthritis       Family History   Problem Relation Age of Onset    Arthritis Mother     Other Father         Myasthenia Gravis    Heart Disease Father     Hypertension Father     No Known Problems Sister     No Known Problems Brother     No Known Problems Brother     No Known Problems Son     No Known Problems Daughter        Past Surgical History:   Procedure Laterality Date    CLEFT PALATE REPAIR  1964 and 1971    DILATION AND CURETTAGE OF UTERUS  12/09/2011    HYSTEROSCOPY,NOVASURE ABLATION    INNER EAR SURGERY Right 10/2022    Right ear mass    MANDIBLE SURGERY  01/01/1990    OVARY REMOVAL  2008    and cyst    TOTAL KNEE ARTHROPLASTY  03/25/2011    Left       Social History     Occupational History    Occupation: Pharmacy, IV technician   Tobacco Use    Smoking status: Never     Passive exposure: Past    Smokeless tobacco: Never   Vaping Use    Vaping Use: Never used   Substance and Sexual Activity    Alcohol use: No    Drug use: No    Sexual activity: Yes     Partners: Male       Current Outpatient Medications   Medication Sig Dispense Refill    traZODone (DESYREL) 50 MG tablet Take 1-2 tablets by mouth nightly 60 tablet 1    labetalol (NORMODYNE) 100 MG tablet Take 100 mg by mouth 2 times daily      Multiple Vitamins-Minerals (CENTRUM SILVER 50+WOMEN) TABS Take by mouth daily      Misc Natural Products (OSTEO BI-FLEX ADV TRIPLE ST) TABS Take by mouth       No current facility-administered medications for this visit.          Objective:     She is alert, oriented x 3, pleasant, well nourished, developed and in no   acute distress. Ht 5' 4\" (1.626 m)   Wt 154 lb (69.9 kg)   LMP 12/01/2011   BMI 26.43 kg/m²      Examination of the right knee: Inspection of the skin reveals no unusual erythema. Knee alignment is neutral.  Gait- Antalgic. There is no intra-articular effusion. ROM: Extension- 0 . Flexion- 110. There is moderate pain associated with ROM testing. Medial joint . Lateral joint line  tender. Pes anserine bursa non-tender. Popliteal fossa non-tender. Patellar Compression testing positive. Extensor Mechanism is intact. Examination of the right hip shows:  No discoloration, wounds or gross deformity. Greater trochanter/bursa palpation non-tender. Anterior superior iliac spine is non-tender. Ischial tuberosity is non-tender. Sacroiliac joint is non-tender. ROM:  -Flexion 125                      (Nml 120-135 degrees)  -Extension 25                  (Nml 20-30 degrees)  -Abduction 45                  (Nml 40-50 degrees)  -Internal rotation 25         (Nml 30 degrees)  -External rotation 45        (Nml 50 degrees)    [facility] resisted hip flexion test positive. Lower extremities:  5/5 motor strength of bilateral lower extremities. Negative straight leg raise, bilaterally. Reflexes at knees and achilles are 2+. Sensation is intact to light touch L3 to S1 bilaterally. There is no clonus. Examination of the lower extremities shows intact perfusion to both lower extremities. No cyanosis and digigts are warm to touch, capillary refill is less than 2 seconds. There is no edema noted. Intact skin without lacerations or abrasions, no significant erythema, rashes or skin lesions. X Rays: performed in the office today:   AP and PA standing, lateral and sunrise views of right knee:   No acute fractures or dislocations noted. Knee x-rays demonstrate osteoarthritis.    Medial compartment- 2/4 Kellgren and Juliano Classification. Lateral compartment-    1/4 Kellgren and Juliano Classification. PF compartment-          4/4 Kellgren and Juliano Classification. Previous x-rays of hips shows moderate to advanced hip arthritis. Diagnosis:       ICD-10-CM    1. Primary osteoarthritis of right knee  M17.11 XR KNEE RIGHT (MIN 4 VIEWS)     NH ARTHROCENTESIS ASPIR&/INJ MAJOR JT/BURSA W/O US     bupivacaine (MARCAINE) 0.25 % injection 5 mg     triamcinolone acetonide (KENALOG-40) injection 40 mg           Assessment and Plan:       Assessment:  Increased right knee pain status post completion of viscosupplementation injections. She does have advanced patellofemoral arthritis of the right knee as well as advanced arthritis of the hip. Discussed right knee intra-articular steroid injection versus right hip intra-articular steroid injection. At this point she would like to try injection for the right knee. I had an extensive discussion with Ms. Sarina Mcginnis regarding the natural history, etiology, and long term consequences of her condition. I have presented reasonable alternatives to the patient's proposed care, treatment, and services. Risks and benefits of the treatment options also reviewed in detail. I have outlined a treatment plan with them. She has had full opportunity to ask her questions. I have answered them all to her satisfaction. I feel that Ms. Sarina Mcginnis understands our discussion today. Plan:    Procedures- The Risks and benefits of corticosteroid intra-articular injections were discussed today. All questions were answered to her satisfaction. She verbally consented to proceed with intra-articular injection today. Cortisone Injection                                                       PROCEDURE NOTE:  Diagnosis:  Right knee pain due to arthritis. With Sarina Mcginnis permission, her right knee was prepped  in standard sterile fashion with  Alcohol. 2 cc of 0.25% Marcaine and 1 cc of Kenalog 40 mg was injected into the right lateral compartment  without difficulty. She tolerated this well without difficulty. A band-aid was applied. She was advised to ice the right knee for 15-20 minutes to relieve any injection site related pain. Follow up- 4 weeks. Call or return to clinic if these symptoms worsen or fail to improve as anticipated. Shayne Webber PA-C   Senior Physician Assistant   [facility]                                         Disclaimer: This note was generated with use of a verbal recognition program (DRAGON) and an attempt was made to check for errors. It is possible that there are still dictated errors within this office note. If so, please bring any significant errors to my attention for an addendum. All efforts were made to ensure that this office note is accurate.

## 2023-03-20 ENCOUNTER — OFFICE VISIT (OUTPATIENT)
Dept: ORTHOPEDIC SURGERY | Age: 63
End: 2023-03-20
Payer: COMMERCIAL

## 2023-03-20 VITALS — HEIGHT: 64 IN | WEIGHT: 155 LBS | BODY MASS INDEX: 26.46 KG/M2

## 2023-03-20 DIAGNOSIS — M25.559 HIP PAIN: Primary | ICD-10-CM

## 2023-03-20 DIAGNOSIS — Z01.818 PRE-OP TESTING: ICD-10-CM

## 2023-03-20 DIAGNOSIS — M25.561 RIGHT KNEE PAIN, UNSPECIFIED CHRONICITY: ICD-10-CM

## 2023-03-20 DIAGNOSIS — M17.11 PRIMARY OSTEOARTHRITIS OF RIGHT KNEE: ICD-10-CM

## 2023-03-20 PROCEDURE — 99214 OFFICE O/P EST MOD 30 MIN: CPT | Performed by: ORTHOPAEDIC SURGERY

## 2023-03-20 PROCEDURE — G8427 DOCREV CUR MEDS BY ELIG CLIN: HCPCS | Performed by: ORTHOPAEDIC SURGERY

## 2023-03-20 PROCEDURE — G8484 FLU IMMUNIZE NO ADMIN: HCPCS | Performed by: ORTHOPAEDIC SURGERY

## 2023-03-20 PROCEDURE — 3017F COLORECTAL CA SCREEN DOC REV: CPT | Performed by: ORTHOPAEDIC SURGERY

## 2023-03-20 PROCEDURE — G8417 CALC BMI ABV UP PARAM F/U: HCPCS | Performed by: ORTHOPAEDIC SURGERY

## 2023-03-20 PROCEDURE — 1036F TOBACCO NON-USER: CPT | Performed by: ORTHOPAEDIC SURGERY

## 2023-03-20 RX ORDER — METHOCARBAMOL 500 MG/1
500 TABLET, FILM COATED ORAL NIGHTLY
Qty: 30 TABLET | Refills: 0 | Status: SHIPPED | OUTPATIENT
Start: 2023-03-20 | End: 2023-04-19

## 2023-03-20 NOTE — PROGRESS NOTES
Partner Violence: Not on file   Housing Stability: Not on file         Family History:   Cancer-related family history is not on file. Review of Systems:  No personal history of DVT, PE. 12 point ROS otherwise negative other than reported in HPI. Physical Examination:  Patient is alert and oriented x 3 and appears well nourished and appropriate for today's visit. Height:   Ht Readings from Last 3 Encounters:   03/20/23 5' 4\" (1.626 m)   03/08/23 5' 4\" (1.626 m)   03/02/23 5' 4\" (1.626 m)     Weight:   Wt Readings from Last 3 Encounters:   03/20/23 155 lb (70.3 kg)   03/08/23 154 lb (69.9 kg)   03/02/23 154 lb (69.9 kg)     Gait: The patient walks with antalgic gait. Right Knee: no effusion             Ligaments stable to varus/valgus stress at full extension and 30 degrees. AROM Right: 0-120 deg               Positive patellofemoral crepitus             Positive medial  joint line tenderness   Hips: Preserved, pain free range of motion in both hips. Skin: Skin appears to be intact in both upper and lower extremities. There does not appear to be any ulceration or other non-healing wounds. Radiographs: Standing AP/lateral/Sunrise Knee: Imaging was reviewed with the patient. There are severe degenerative changes of the RIGHT  knee with joint space narrowing, subchondral sclerosis, and osteophyte formation most pronounced in the PFJ. There is no radiographic evidence of AVN, fracture, or dislocation. Elective RIGHT TKA     Assessment and Plan?: The patient has functionally disabling knee pain with advanced degenerative changes of the RIGHT knee        We discussed the diagnosis and treatment options in detail with the patient. Patient has tried conservative treatment including anti-inflammatories, activity modification, physical therapy, use of cane, injections.  Given the failure of these conservative measures the patient is a good candidate for an elective total knee arthroplasty  We

## 2023-03-20 NOTE — LETTER
3 [facility]. Julia Lord      Dear Patient: Cee Viera    As a valued customer, we would like to thank you for choosing [facility] for your upcoming surgery/procedure. CHECKLIST FOR SURGERY:    _____ Get Lab work & Tests done at [facility] the week of 06/05/2023    _____ Scan QR code and watch joint education video    _____   Visit/Speak with Dorcas Menezes Prior to surgery (They will call to schedule)    _____   Schedule a History & physical exam by your primary care physician within 30 days of your surgery date.      _____   Wash with soap provided starting 4 days prior to surgery. Last time you wash with soap is the morning of surgery. (see instruction sheet in folder)    _____   Nothing to eat or drink after midnight the night before surgery. _____   No aspirin products or blood thinners for one week prior to surgery. Includes all NSAIDs such as ibuprofen, aleve, diclofenac, etc.    _____ Medical clearance by a cardiologist/pulmonologist/other specialists depending on your circumstances. _____ Inform office of any insurance changes or change in phone numbers. _____ Provide a copy of your advanced directive/durable power of /living will on the day of your surgery/procedure. ** You will receive at least two calls from the Hospital.  One will be from Registration to pre-register you and go over your address, phone number, and insurance information. You will also hear from the Pre-Admission testing nurses. They will want to get a brief medical history and also go over your list of medications.     If you have any further questions please feel free to contact me at [phone]    Sincerely,    Jolly Clarke, Medical Assistant, Surgery Scheduler for Dr. Tk Perez
Dr Ivana Solorzano [phone] F: [phone]  Surgery Scheduling Form:  DEMOGRAPHICS:                                                                                                                  Patient Name:  Brodie Coulter    Patient :  1960   Patient SS#:  [id number]      Patient Phone:  [phone] (home)      Patient Address:  [address] [place] [address]    Insurance:  ***    DIAGNOSIS & PROCEDURE:                                                                                                Diagnosis:  Right knee arthritis M17.1    Operation:  Right total knee replacement     [facility]:  [facility]    Surgeon:  Jhony Donahue    SCHEDULING INFORMATION:                                                                                         .    Requested Date:  ***   OR Time: ***   Patient Arrival Time: ***     OR Time Required:  90 minutes    Anesthesia:  spinal SA Required:  Yes x 2    Equipment:  Hugo cemented    Status: outpatient discharge       PAT Required:  Yes     Latex Allergy:  no Defibrilator or Pacemaker:  no    Isolation Precautions:  no                        Luzmaria Dhillon MD     3/20/23   BILLING INFORMATION:                                                                                                    .                          CPT Code Modifier  Total knee    Prior auth:  [id number]
DM, or who have two or more risk factors--age > 65, smoker, HTN, or hyperlipidemia.    Recommendations:   If on beta-blocker already, continue current drug and titrate to target   If not on beta-blocker, recommend Atenolol or Metoprolol and titrate to target   Target systolic BP < 070, Pulse < 70   Please begin therapy prior to surgery and titrate to goal   Beta-Blocker should be continued for at least 30 days post-operatively as tolerated  Prescription written and therapy instituted  The patient would benefit from beta-blocker therapy, but their use is contraindicated (i.e. reactive airway disease exacerbated by bets-blockers, 2nd or 3rd degree heart block, severe aortic stenosis, other _______________________________________  The patient is to continue their current beta-blocker therapy    Physician Signature ___________________________________________     Date ____________
faxed to medical records at [phone] or dropped off in our offices. The contact number for medical records is [phone]. Please contact our office by phone at [phone] or fax [phone] with any questions.

## 2023-04-13 PROBLEM — M16.11 ARTHRITIS OF RIGHT HIP: Status: ACTIVE | Noted: 2023-04-13

## 2023-04-24 ENCOUNTER — TELEPHONE (OUTPATIENT)
Dept: ORTHOPEDIC SURGERY | Age: 63
End: 2023-04-24

## 2023-04-24 DIAGNOSIS — M17.11 PRIMARY OSTEOARTHRITIS OF RIGHT KNEE: Primary | ICD-10-CM

## 2023-04-24 RX ORDER — TRAMADOL HYDROCHLORIDE 50 MG/1
50 TABLET ORAL EVERY 8 HOURS PRN
Qty: 21 TABLET | Refills: 0 | Status: SHIPPED | OUTPATIENT
Start: 2023-04-24 | End: 2023-05-19

## 2023-04-28 RX ORDER — ASCORBIC ACID 500 MG
1000 TABLET ORAL DAILY
COMMUNITY

## 2023-04-28 RX ORDER — VITAMIN E 268 MG
400 CAPSULE ORAL DAILY
COMMUNITY

## 2023-05-04 ENCOUNTER — TELEPHONE (OUTPATIENT)
Dept: ORTHOPEDIC SURGERY | Age: 63
End: 2023-05-04

## 2023-05-04 NOTE — TELEPHONE ENCOUNTER
Records release form faxed to number given. Faxed confirmation received. Scanned into media.     Patient informed we are just waiting on records to be faxed to schedule follow up appointment SUBMITTED OUTPATIENT SX PA R KNEE VIA [facility] PORTAL. STATUS PENDING. [id number].

## 2023-05-04 NOTE — TELEPHONE ENCOUNTER
Madi Turcios [id number]    Date: 06/07/23  Type of SX:  OUTPATIENT  Location: [facility]  CPT: 00730   DX Code: M17.11  SX area: [facility]  Insurance: [facility]

## 2023-05-16 DIAGNOSIS — M17.11 PRIMARY OSTEOARTHRITIS OF RIGHT KNEE: ICD-10-CM

## 2023-05-16 DIAGNOSIS — M25.561 RIGHT KNEE PAIN, UNSPECIFIED CHRONICITY: ICD-10-CM

## 2023-05-16 DIAGNOSIS — Z01.818 PRE-OP TESTING: ICD-10-CM

## 2023-05-16 LAB
25(OH)D3 SERPL-MCNC: 39.8 NG/ML
ALBUMIN SERPL-MCNC: 4.5 G/DL (ref 3.4–5)
ALBUMIN/GLOB SERPL: 2.1 {RATIO} (ref 1.1–2.2)
ALP SERPL-CCNC: 66 U/L (ref 40–129)
ALT SERPL-CCNC: 45 U/L (ref 10–40)
ANION GAP SERPL CALCULATED.3IONS-SCNC: 12 MMOL/L (ref 3–16)
APTT BLD: 28.4 SEC (ref 22.7–35.9)
AST SERPL-CCNC: 26 U/L (ref 15–37)
BILIRUB SERPL-MCNC: 0.3 MG/DL (ref 0–1)
BUN SERPL-MCNC: 24 MG/DL (ref 7–20)
CALCIUM SERPL-MCNC: 9.9 MG/DL (ref 8.3–10.6)
CHLORIDE SERPL-SCNC: 105 MMOL/L (ref 99–110)
CO2 SERPL-SCNC: 26 MMOL/L (ref 21–32)
CREAT SERPL-MCNC: 0.8 MG/DL (ref 0.6–1.2)
GFR SERPLBLD CREATININE-BSD FMLA CKD-EPI: >60 ML/MIN/{1.73_M2}
GLUCOSE SERPL-MCNC: 80 MG/DL (ref 70–99)
INR PPP: 1.14 (ref 0.84–1.16)
POTASSIUM SERPL-SCNC: 5.2 MMOL/L (ref 3.5–5.1)
PROT SERPL-MCNC: 6.6 G/DL (ref 6.4–8.2)
PROTHROMBIN TIME: 14.6 SEC (ref 11.5–14.8)
SODIUM SERPL-SCNC: 143 MMOL/L (ref 136–145)

## 2023-05-17 LAB
ANISOCYTOSIS BLD QL SMEAR: ABNORMAL
BASOPHILS # BLD: 0.1 K/UL (ref 0–0.2)
BASOPHILS NFR BLD: 1 %
BURR CELLS BLD QL SMEAR: ABNORMAL
DEPRECATED RDW RBC AUTO: 13.1 % (ref 12.4–15.4)
EOSINOPHIL # BLD: 0.2 K/UL (ref 0–0.6)
EOSINOPHIL NFR BLD: 3 %
HCT VFR BLD AUTO: 39.7 % (ref 36–48)
HGB BLD-MCNC: 13.4 G/DL (ref 12–16)
LYMPHOCYTES # BLD: 1.8 K/UL (ref 1–5.1)
LYMPHOCYTES NFR BLD: 27 %
MCH RBC QN AUTO: 30.8 PG (ref 26–34)
MCHC RBC AUTO-ENTMCNC: 33.8 G/DL (ref 31–36)
MCV RBC AUTO: 91.1 FL (ref 80–100)
MONOCYTES # BLD: 0.5 K/UL (ref 0–1.3)
MONOCYTES NFR BLD: 8 %
MRSA DNA SPEC QL NAA+PROBE: NORMAL
NEUTROPHILS # BLD: 4 K/UL (ref 1.7–7.7)
NEUTROPHILS NFR BLD: 60 %
NEUTS BAND NFR BLD MANUAL: 1 % (ref 0–7)
PLATELET # BLD AUTO: 308 K/UL (ref 135–450)
PLATELET BLD QL SMEAR: ADEQUATE
PMV BLD AUTO: 9 FL (ref 5–10.5)
RBC # BLD AUTO: 4.36 M/UL (ref 4–5.2)
SLIDE REVIEW: ABNORMAL
WBC # BLD AUTO: 6.6 K/UL (ref 4–11)

## 2023-05-18 DIAGNOSIS — M17.11 PRIMARY OSTEOARTHRITIS OF RIGHT KNEE: ICD-10-CM

## 2023-05-18 PROBLEM — H66.11 CHRONIC TUBOTYMPANIC SUPPURATIVE OTITIS MEDIA OF RIGHT EAR: Status: RESOLVED | Noted: 2017-05-01 | Resolved: 2023-05-18

## 2023-05-18 PROBLEM — M16.11 ARTHRITIS OF RIGHT HIP: Status: RESOLVED | Noted: 2023-04-13 | Resolved: 2023-05-18

## 2023-05-18 PROBLEM — H93.13 SUBJECTIVE TINNITUS OF BOTH EARS: Status: RESOLVED | Noted: 2017-06-12 | Resolved: 2023-05-18

## 2023-05-18 PROBLEM — H92.01 OTALGIA, RIGHT EAR: Status: RESOLVED | Noted: 2018-08-13 | Resolved: 2023-05-18

## 2023-05-18 PROBLEM — M65.331 TRIGGER MIDDLE FINGER OF RIGHT HAND: Status: RESOLVED | Noted: 2018-06-04 | Resolved: 2023-05-18

## 2023-05-18 PROBLEM — H90.A22 SENSORINEURAL HEARING LOSS (SNHL) OF LEFT EAR WITH RESTRICTED HEARING OF RIGHT EAR: Status: RESOLVED | Noted: 2017-06-12 | Resolved: 2023-05-18

## 2023-05-18 LAB
EST. AVERAGE GLUCOSE BLD GHB EST-MCNC: 91.1 MG/DL
HBA1C MFR BLD: 4.8 %

## 2023-05-19 RX ORDER — TRAMADOL HYDROCHLORIDE 50 MG/1
TABLET ORAL
Qty: 21 TABLET | Refills: 0 | Status: SHIPPED | OUTPATIENT
Start: 2023-05-19 | End: 2023-05-26

## 2023-06-02 ENCOUNTER — OFFICE VISIT (OUTPATIENT)
Dept: ORTHOPEDIC SURGERY | Age: 63
End: 2023-06-02
Payer: COMMERCIAL

## 2023-06-02 VITALS — WEIGHT: 147 LBS | HEIGHT: 64 IN | BODY MASS INDEX: 25.1 KG/M2

## 2023-06-02 DIAGNOSIS — M17.11 PRIMARY OSTEOARTHRITIS OF RIGHT KNEE: Primary | ICD-10-CM

## 2023-06-02 PROCEDURE — 99214 OFFICE O/P EST MOD 30 MIN: CPT | Performed by: ORTHOPAEDIC SURGERY

## 2023-06-02 PROCEDURE — G8417 CALC BMI ABV UP PARAM F/U: HCPCS | Performed by: ORTHOPAEDIC SURGERY

## 2023-06-02 PROCEDURE — 1036F TOBACCO NON-USER: CPT | Performed by: ORTHOPAEDIC SURGERY

## 2023-06-02 PROCEDURE — 3017F COLORECTAL CA SCREEN DOC REV: CPT | Performed by: ORTHOPAEDIC SURGERY

## 2023-06-02 PROCEDURE — G8427 DOCREV CUR MEDS BY ELIG CLIN: HCPCS | Performed by: ORTHOPAEDIC SURGERY

## 2023-06-02 NOTE — PROGRESS NOTES
Patient: Sundar Moody  : 1960    MRN: [id number]    Date of Visit: 23    Attending Physician: Pb Estrella    History of Present Illness  Ms. Yair Olvera is a very pleasant 63 y.o. patient with a several year history of progressive RIGHT knee pain. She also has some hip pain. There is no precipitating event or trauma. The pain is located predominantly in the medial and anterior aspect of the knee aggravated by weight bearing. Walking even short distances can be painful. Stair climbing is progressing becoming more difficult and painful. However, it can also awaken the patient at night. She has tried the following interventions without sustained functional improvement:    Low-impact, structured therapy/exercise program  NSAID's/Tylenol Arthritis strength  Crtisone injections/viscosupplementation on 3/9 with only transient improvement  Knee brace  Cane for ambulation    I am seeing her back today to review and discuss her preoperative lab work and to further discuss the perioperative process of TJA. They have been taking robaxin as prescribed without side effects.       PMH/PSH:  Past Medical History:   Diagnosis Date    Arthritis     lt knee/hands    Hypertension     Prolonged emergence from general anesthesia     Stickler syndrome     Cleft palate, hearing loss, arthritis     Patient Active Problem List   Diagnosis    Stickler syndrome    Primary hypertension    Mixed hearing loss    S/P total knee arthroplasty, left 3..    Effusion of left knee    LEONID (obstructive sleep apnea)    Primary osteoarthritis of right knee     Past Surgical History:   Procedure Laterality Date    CLEFT PALATE REPAIR   and [address] OF UTERUS  2011    HYSTEROSCOPY,NOVASURE ABLATION    INNER EAR SURGERY Right 10/2022    Right ear mass    MANDIBLE SURGERY  1990    OVARY REMOVAL      and cyst    TOTAL KNEE ARTHROPLASTY  2011    Left           MEDS:  Scheduled

## 2023-06-06 ENCOUNTER — ANESTHESIA EVENT (OUTPATIENT)
Dept: OPERATING ROOM | Age: 63
End: 2023-06-06
Payer: COMMERCIAL

## 2023-06-07 ENCOUNTER — APPOINTMENT (OUTPATIENT)
Dept: GENERAL RADIOLOGY | Age: 63
End: 2023-06-07
Attending: ORTHOPAEDIC SURGERY
Payer: COMMERCIAL

## 2023-06-07 ENCOUNTER — HOSPITAL ENCOUNTER (OUTPATIENT)
Age: 63
Setting detail: OUTPATIENT SURGERY
Discharge: HOME | End: 2023-06-07
Attending: ORTHOPAEDIC SURGERY | Admitting: ORTHOPAEDIC SURGERY
Payer: COMMERCIAL

## 2023-06-07 ENCOUNTER — ANESTHESIA (OUTPATIENT)
Dept: OPERATING ROOM | Age: 63
End: 2023-06-07
Payer: COMMERCIAL

## 2023-06-07 VITALS
SYSTOLIC BLOOD PRESSURE: 126 MMHG | BODY MASS INDEX: 23.59 KG/M2 | HEIGHT: 65 IN | HEART RATE: 64 BPM | OXYGEN SATURATION: 98 % | RESPIRATION RATE: 12 BRPM | DIASTOLIC BLOOD PRESSURE: 92 MMHG | TEMPERATURE: 98.4 F | WEIGHT: 141.6 LBS

## 2023-06-07 DIAGNOSIS — M17.11 PRIMARY OSTEOARTHRITIS OF RIGHT KNEE: Primary | ICD-10-CM

## 2023-06-07 LAB
ANION GAP SERPL CALCULATED.3IONS-SCNC: 8 MMOL/L (ref 3–16)
BUN SERPL-MCNC: 18 MG/DL (ref 7–20)
CALCIUM SERPL-MCNC: 10 MG/DL (ref 8.3–10.6)
CHLORIDE SERPL-SCNC: 106 MMOL/L (ref 99–110)
CO2 SERPL-SCNC: 26 MMOL/L (ref 21–32)
CREAT SERPL-MCNC: 0.6 MG/DL (ref 0.6–1.2)
GFR SERPLBLD CREATININE-BSD FMLA CKD-EPI: >60 ML/MIN/{1.73_M2}
GLUCOSE BLD-MCNC: 92 MG/DL (ref 70–99)
GLUCOSE SERPL-MCNC: 91 MG/DL (ref 70–99)
PERFORMED ON: NORMAL
POTASSIUM SERPL-SCNC: 4.2 MMOL/L (ref 3.5–5.1)
SODIUM SERPL-SCNC: 140 MMOL/L (ref 136–145)

## 2023-06-07 PROCEDURE — 27447 TOTAL KNEE ARTHROPLASTY: CPT | Performed by: NURSE PRACTITIONER

## 2023-06-07 PROCEDURE — 6370000000 HC RX 637 (ALT 250 FOR IP)

## 2023-06-07 PROCEDURE — 3700000001 HC ADD 15 MINUTES (ANESTHESIA): Performed by: ORTHOPAEDIC SURGERY

## 2023-06-07 PROCEDURE — 97162 PT EVAL MOD COMPLEX 30 MIN: CPT

## 2023-06-07 PROCEDURE — 2500000003 HC RX 250 WO HCPCS: Performed by: ORTHOPAEDIC SURGERY

## 2023-06-07 PROCEDURE — 2580000003 HC RX 258: Performed by: NURSE PRACTITIONER

## 2023-06-07 PROCEDURE — 6360000002 HC RX W HCPCS: Performed by: ORTHOPAEDIC SURGERY

## 2023-06-07 PROCEDURE — C1713 ANCHOR/SCREW BN/BN,TIS/BN: HCPCS | Performed by: ORTHOPAEDIC SURGERY

## 2023-06-07 PROCEDURE — 3700000000 HC ANESTHESIA ATTENDED CARE: Performed by: ORTHOPAEDIC SURGERY

## 2023-06-07 PROCEDURE — 73560 X-RAY EXAM OF KNEE 1 OR 2: CPT

## 2023-06-07 PROCEDURE — 97535 SELF CARE MNGMENT TRAINING: CPT

## 2023-06-07 PROCEDURE — 3600000014 HC SURGERY LEVEL 4 ADDTL 15MIN: Performed by: ORTHOPAEDIC SURGERY

## 2023-06-07 PROCEDURE — 36415 COLL VENOUS BLD VENIPUNCTURE: CPT

## 2023-06-07 PROCEDURE — 6360000002 HC RX W HCPCS: Performed by: ANESTHESIOLOGY

## 2023-06-07 PROCEDURE — 7100000000 HC PACU RECOVERY - FIRST 15 MIN: Performed by: ORTHOPAEDIC SURGERY

## 2023-06-07 PROCEDURE — 97116 GAIT TRAINING THERAPY: CPT

## 2023-06-07 PROCEDURE — 80048 BASIC METABOLIC PNL TOTAL CA: CPT

## 2023-06-07 PROCEDURE — 6370000000 HC RX 637 (ALT 250 FOR IP): Performed by: ORTHOPAEDIC SURGERY

## 2023-06-07 PROCEDURE — 6360000002 HC RX W HCPCS

## 2023-06-07 PROCEDURE — 2720000010 HC SURG SUPPLY STERILE: Performed by: ORTHOPAEDIC SURGERY

## 2023-06-07 PROCEDURE — 97165 OT EVAL LOW COMPLEX 30 MIN: CPT

## 2023-06-07 PROCEDURE — C1776 JOINT DEVICE (IMPLANTABLE): HCPCS | Performed by: ORTHOPAEDIC SURGERY

## 2023-06-07 PROCEDURE — 6360000002 HC RX W HCPCS: Performed by: NURSE PRACTITIONER

## 2023-06-07 PROCEDURE — 7100000001 HC PACU RECOVERY - ADDTL 15 MIN: Performed by: ORTHOPAEDIC SURGERY

## 2023-06-07 PROCEDURE — 3600000004 HC SURGERY LEVEL 4 BASE: Performed by: ORTHOPAEDIC SURGERY

## 2023-06-07 PROCEDURE — 7100000010 HC PHASE II RECOVERY - FIRST 15 MIN: Performed by: ORTHOPAEDIC SURGERY

## 2023-06-07 PROCEDURE — 7100000011 HC PHASE II RECOVERY - ADDTL 15 MIN: Performed by: ORTHOPAEDIC SURGERY

## 2023-06-07 PROCEDURE — 2580000003 HC RX 258: Performed by: ORTHOPAEDIC SURGERY

## 2023-06-07 PROCEDURE — 27447 TOTAL KNEE ARTHROPLASTY: CPT | Performed by: ORTHOPAEDIC SURGERY

## 2023-06-07 PROCEDURE — 6360000002 HC RX W HCPCS: Performed by: NURSE ANESTHETIST, CERTIFIED REGISTERED

## 2023-06-07 PROCEDURE — 2500000003 HC RX 250 WO HCPCS: Performed by: NURSE ANESTHETIST, CERTIFIED REGISTERED

## 2023-06-07 PROCEDURE — 2709999900 HC NON-CHARGEABLE SUPPLY: Performed by: ORTHOPAEDIC SURGERY

## 2023-06-07 DEVICE — PSN TIB STM 5 DEG SZ E R: Type: IMPLANTABLE DEVICE | Site: KNEE | Status: FUNCTIONAL

## 2023-06-07 DEVICE — PSN MC VE ASF R 10MM 8-11 EF: Type: IMPLANTABLE DEVICE | Site: KNEE | Status: FUNCTIONAL

## 2023-06-07 DEVICE — IMPLANTABLE DEVICE: Type: IMPLANTABLE DEVICE | Site: KNEE | Status: FUNCTIONAL

## 2023-06-07 DEVICE — IMPLANTABLE DEVICE
Type: IMPLANTABLE DEVICE | Site: KNEE | Status: FUNCTIONAL
Brand: BIOMET® BONE CEMENT R

## 2023-06-07 DEVICE — IMPLANTABLE DEVICE
Type: IMPLANTABLE DEVICE | Site: KNEE | Status: FUNCTIONAL
Brand: PERSONA® VIVACIT-E®

## 2023-06-07 RX ORDER — TRANEXAMIC ACID 10 MG/ML
1000 INJECTION, SOLUTION INTRAVENOUS
Status: COMPLETED | OUTPATIENT
Start: 2023-06-07 | End: 2023-06-07

## 2023-06-07 RX ORDER — OXYCODONE HYDROCHLORIDE AND ACETAMINOPHEN 5; 325 MG/1; MG/1
TABLET ORAL
Status: COMPLETED
Start: 2023-06-07 | End: 2023-06-07

## 2023-06-07 RX ORDER — SENNA PLUS 8.6 MG/1
1 TABLET ORAL 2 TIMES DAILY
Qty: 28 TABLET | Refills: 0 | Status: SHIPPED | OUTPATIENT
Start: 2023-06-07 | End: 2023-06-21

## 2023-06-07 RX ORDER — OXYCODONE HYDROCHLORIDE 5 MG/1
5 TABLET ORAL EVERY 4 HOURS PRN
Qty: 42 TABLET | Refills: 0 | Status: SHIPPED | OUTPATIENT
Start: 2023-06-07 | End: 2023-06-14

## 2023-06-07 RX ORDER — MEPERIDINE HYDROCHLORIDE 25 MG/ML
12.5 INJECTION INTRAMUSCULAR; INTRAVENOUS; SUBCUTANEOUS EVERY 5 MIN PRN
Status: DISCONTINUED | OUTPATIENT
Start: 2023-06-07 | End: 2023-06-07

## 2023-06-07 RX ORDER — ONDANSETRON 2 MG/ML
4 INJECTION INTRAMUSCULAR; INTRAVENOUS
Status: DISCONTINUED | OUTPATIENT
Start: 2023-06-07 | End: 2023-06-07

## 2023-06-07 RX ORDER — DEXMEDETOMIDINE HYDROCHLORIDE 100 UG/ML
INJECTION, SOLUTION INTRAVENOUS PRN
Status: DISCONTINUED | OUTPATIENT
Start: 2023-06-07 | End: 2023-06-07 | Stop reason: SDUPTHER

## 2023-06-07 RX ORDER — FAMOTIDINE 10 MG/ML
INJECTION, SOLUTION INTRAVENOUS PRN
Status: DISCONTINUED | OUTPATIENT
Start: 2023-06-07 | End: 2023-06-07 | Stop reason: SDUPTHER

## 2023-06-07 RX ORDER — ACETAMINOPHEN 650 MG
TABLET, EXTENDED RELEASE ORAL
Status: COMPLETED | OUTPATIENT
Start: 2023-06-07 | End: 2023-06-07

## 2023-06-07 RX ORDER — LIDOCAINE HYDROCHLORIDE 10 MG/ML
0.5 INJECTION, SOLUTION EPIDURAL; INFILTRATION; INTRACAUDAL; PERINEURAL ONCE
Status: COMPLETED | OUTPATIENT
Start: 2023-06-07 | End: 2023-06-07

## 2023-06-07 RX ORDER — ONDANSETRON 2 MG/ML
INJECTION INTRAMUSCULAR; INTRAVENOUS PRN
Status: DISCONTINUED | OUTPATIENT
Start: 2023-06-07 | End: 2023-06-07 | Stop reason: SDUPTHER

## 2023-06-07 RX ORDER — SODIUM CHLORIDE 9 MG/ML
INJECTION, SOLUTION INTRAVENOUS PRN
Status: DISCONTINUED | OUTPATIENT
Start: 2023-06-07 | End: 2023-06-07 | Stop reason: HOSPADM

## 2023-06-07 RX ORDER — VANCOMYCIN HYDROCHLORIDE 1 G/20ML
INJECTION, POWDER, LYOPHILIZED, FOR SOLUTION INTRAVENOUS
Status: COMPLETED | OUTPATIENT
Start: 2023-06-07 | End: 2023-06-07

## 2023-06-07 RX ORDER — HYDROMORPHONE HCL 110MG/55ML
0.5 PATIENT CONTROLLED ANALGESIA SYRINGE INTRAVENOUS EVERY 5 MIN PRN
Status: DISCONTINUED | OUTPATIENT
Start: 2023-06-07 | End: 2023-06-07

## 2023-06-07 RX ORDER — SODIUM CHLORIDE 9 MG/ML
INJECTION, SOLUTION INTRAVENOUS PRN
Status: DISCONTINUED | OUTPATIENT
Start: 2023-06-07 | End: 2023-06-07

## 2023-06-07 RX ORDER — MAGNESIUM HYDROXIDE 1200 MG/15ML
LIQUID ORAL CONTINUOUS PRN
Status: COMPLETED | OUTPATIENT
Start: 2023-06-07 | End: 2023-06-07

## 2023-06-07 RX ORDER — PROPOFOL 10 MG/ML
INJECTION, EMULSION INTRAVENOUS PRN
Status: DISCONTINUED | OUTPATIENT
Start: 2023-06-07 | End: 2023-06-07 | Stop reason: SDUPTHER

## 2023-06-07 RX ORDER — OXYCODONE HYDROCHLORIDE AND ACETAMINOPHEN 5; 325 MG/1; MG/1
1 TABLET ORAL ONCE
Status: COMPLETED | OUTPATIENT
Start: 2023-06-07 | End: 2023-06-07

## 2023-06-07 RX ORDER — SODIUM CHLORIDE 0.9 % (FLUSH) 0.9 %
5-40 SYRINGE (ML) INJECTION EVERY 12 HOURS SCHEDULED
Status: DISCONTINUED | OUTPATIENT
Start: 2023-06-07 | End: 2023-06-07 | Stop reason: HOSPADM

## 2023-06-07 RX ORDER — ACETAMINOPHEN 500 MG
1000 TABLET ORAL ONCE
Status: COMPLETED | OUTPATIENT
Start: 2023-06-07 | End: 2023-06-07

## 2023-06-07 RX ORDER — CELECOXIB 200 MG/1
400 CAPSULE ORAL ONCE
Status: COMPLETED | OUTPATIENT
Start: 2023-06-07 | End: 2023-06-07

## 2023-06-07 RX ORDER — MIDAZOLAM HYDROCHLORIDE 1 MG/ML
INJECTION INTRAMUSCULAR; INTRAVENOUS PRN
Status: DISCONTINUED | OUTPATIENT
Start: 2023-06-07 | End: 2023-06-07 | Stop reason: SDUPTHER

## 2023-06-07 RX ORDER — SODIUM CHLORIDE 0.9 % (FLUSH) 0.9 %
5-40 SYRINGE (ML) INJECTION PRN
Status: DISCONTINUED | OUTPATIENT
Start: 2023-06-07 | End: 2023-06-07

## 2023-06-07 RX ORDER — DEXAMETHASONE SODIUM PHOSPHATE 4 MG/ML
8 INJECTION, SOLUTION INTRA-ARTICULAR; INTRALESIONAL; INTRAMUSCULAR; INTRAVENOUS; SOFT TISSUE ONCE
Status: COMPLETED | OUTPATIENT
Start: 2023-06-07 | End: 2023-06-07

## 2023-06-07 RX ORDER — SODIUM CHLORIDE, SODIUM LACTATE, POTASSIUM CHLORIDE, CALCIUM CHLORIDE 600; 310; 30; 20 MG/100ML; MG/100ML; MG/100ML; MG/100ML
INJECTION, SOLUTION INTRAVENOUS CONTINUOUS
Status: DISCONTINUED | OUTPATIENT
Start: 2023-06-07 | End: 2023-06-07 | Stop reason: HOSPADM

## 2023-06-07 RX ORDER — SODIUM CHLORIDE 0.9 % (FLUSH) 0.9 %
5-40 SYRINGE (ML) INJECTION PRN
Status: DISCONTINUED | OUTPATIENT
Start: 2023-06-07 | End: 2023-06-07 | Stop reason: HOSPADM

## 2023-06-07 RX ORDER — DEXAMETHASONE SODIUM PHOSPHATE 4 MG/ML
INJECTION, SOLUTION INTRA-ARTICULAR; INTRALESIONAL; INTRAMUSCULAR; INTRAVENOUS; SOFT TISSUE PRN
Status: DISCONTINUED | OUTPATIENT
Start: 2023-06-07 | End: 2023-06-07 | Stop reason: SDUPTHER

## 2023-06-07 RX ORDER — ACETAMINOPHEN 500 MG
1000 TABLET ORAL 3 TIMES DAILY
Qty: 84 TABLET | Refills: 0 | Status: SHIPPED | OUTPATIENT
Start: 2023-06-07 | End: 2023-06-21

## 2023-06-07 RX ORDER — MELOXICAM 15 MG/1
15 TABLET ORAL DAILY
Qty: 14 TABLET | Refills: 0 | Status: SHIPPED | OUTPATIENT
Start: 2023-06-07 | End: 2023-06-21

## 2023-06-07 RX ORDER — KETOROLAC TROMETHAMINE 30 MG/ML
15 INJECTION, SOLUTION INTRAMUSCULAR; INTRAVENOUS ONCE
Status: COMPLETED | OUTPATIENT
Start: 2023-06-07 | End: 2023-06-07

## 2023-06-07 RX ORDER — SODIUM CHLORIDE 0.9 % (FLUSH) 0.9 %
5-40 SYRINGE (ML) INJECTION EVERY 12 HOURS SCHEDULED
Status: DISCONTINUED | OUTPATIENT
Start: 2023-06-07 | End: 2023-06-07

## 2023-06-07 RX ORDER — HYDROMORPHONE HCL 110MG/55ML
PATIENT CONTROLLED ANALGESIA SYRINGE INTRAVENOUS
Status: COMPLETED
Start: 2023-06-07 | End: 2023-06-07

## 2023-06-07 RX ADMIN — OXYCODONE HYDROCHLORIDE AND ACETAMINOPHEN 1 TABLET: 5; 325 TABLET ORAL at 13:00

## 2023-06-07 RX ADMIN — DEXMEDETOMIDINE HYDROCHLORIDE 4 MCG: 100 INJECTION, SOLUTION INTRAVENOUS at 07:51

## 2023-06-07 RX ADMIN — PROPOFOL 50 MG: 10 INJECTION, EMULSION INTRAVENOUS at 07:50

## 2023-06-07 RX ADMIN — CELECOXIB 400 MG: 200 CAPSULE ORAL at 07:16

## 2023-06-07 RX ADMIN — DEXMEDETOMIDINE HYDROCHLORIDE 4 MCG: 100 INJECTION, SOLUTION INTRAVENOUS at 07:59

## 2023-06-07 RX ADMIN — FAMOTIDINE 20 MG: 10 INJECTION INTRAVENOUS at 07:39

## 2023-06-07 RX ADMIN — OXYCODONE AND ACETAMINOPHEN 1 TABLET: 5; 325 TABLET ORAL at 13:00

## 2023-06-07 RX ADMIN — LIDOCAINE HYDROCHLORIDE 0.5 ML: 10 INJECTION, SOLUTION EPIDURAL; INFILTRATION; INTRACAUDAL; PERINEURAL at 07:10

## 2023-06-07 RX ADMIN — ONDANSETRON 4 MG: 2 INJECTION INTRAMUSCULAR; INTRAVENOUS at 07:39

## 2023-06-07 RX ADMIN — KETOROLAC TROMETHAMINE 15 MG: 30 INJECTION, SOLUTION INTRAMUSCULAR; INTRAVENOUS at 09:30

## 2023-06-07 RX ADMIN — ACETAMINOPHEN 1000 MG: 500 TABLET ORAL at 07:16

## 2023-06-07 RX ADMIN — DEXAMETHASONE SODIUM PHOSPHATE 8 MG: 4 INJECTION, SOLUTION INTRAMUSCULAR; INTRAVENOUS at 07:39

## 2023-06-07 RX ADMIN — Medication 0.5 MG: at 11:10

## 2023-06-07 RX ADMIN — MIDAZOLAM 1 MG: 1 INJECTION INTRAMUSCULAR; INTRAVENOUS at 07:24

## 2023-06-07 RX ADMIN — CEFAZOLIN 2000 MG: 2 INJECTION, POWDER, FOR SOLUTION INTRAMUSCULAR; INTRAVENOUS at 07:20

## 2023-06-07 RX ADMIN — TRANEXAMIC ACID 1000 MG: 10 INJECTION, SOLUTION INTRAVENOUS at 07:29

## 2023-06-07 RX ADMIN — MEPIVACAINE HYDROCHLORIDE 40 MG: 20 INJECTION, SOLUTION EPIDURAL; INFILTRATION at 07:27

## 2023-06-07 RX ADMIN — DEXAMETHASONE SODIUM PHOSPHATE 8 MG: 4 INJECTION, SOLUTION INTRAMUSCULAR; INTRAVENOUS at 09:29

## 2023-06-07 RX ADMIN — MIDAZOLAM 1 MG: 1 INJECTION INTRAMUSCULAR; INTRAVENOUS at 07:41

## 2023-06-07 RX ADMIN — PROPOFOL 50 MG: 10 INJECTION, EMULSION INTRAVENOUS at 07:39

## 2023-06-07 RX ADMIN — SODIUM CHLORIDE, POTASSIUM CHLORIDE, SODIUM LACTATE AND CALCIUM CHLORIDE: 600; 310; 30; 20 INJECTION, SOLUTION INTRAVENOUS at 07:17

## 2023-06-07 RX ADMIN — HYDROMORPHONE HYDROCHLORIDE 0.5 MG: 2 INJECTION, SOLUTION INTRAMUSCULAR; INTRAVENOUS; SUBCUTANEOUS at 11:10

## 2023-06-07 RX ADMIN — PROPOFOL 50 MG: 10 INJECTION, EMULSION INTRAVENOUS at 07:36

## 2023-06-07 RX ADMIN — CEFAZOLIN 2000 MG: 2 INJECTION, POWDER, FOR SOLUTION INTRAMUSCULAR; INTRAVENOUS at 12:25

## 2023-06-07 RX ADMIN — PROPOFOL 120 MCG/KG/MIN: 10 INJECTION, EMULSION INTRAVENOUS at 07:37

## 2023-06-07 ASSESSMENT — PAIN - FUNCTIONAL ASSESSMENT
PAIN_FUNCTIONAL_ASSESSMENT: ACTIVITIES ARE NOT PREVENTED
PAIN_FUNCTIONAL_ASSESSMENT: 0-10

## 2023-06-07 ASSESSMENT — PAIN DESCRIPTION - ORIENTATION: ORIENTATION: RIGHT

## 2023-06-07 ASSESSMENT — PAIN SCALES - GENERAL
PAINLEVEL_OUTOF10: 7
PAINLEVEL_OUTOF10: 5

## 2023-06-07 ASSESSMENT — PAIN DESCRIPTION - LOCATION: LOCATION: KNEE

## 2023-06-07 ASSESSMENT — PAIN DESCRIPTION - DESCRIPTORS: DESCRIPTORS: SHOOTING;ACHING

## 2023-06-07 NOTE — DISCHARGE INSTRUCTIONS
dentist and providers that you had a joint replacement. You may need to be on an antibiotic before any dental work and procedures. Call your surgeon for your antibiotic prescription. Smoking cessation assistance can be obtained from your family doctor or by [address] @ [phone]    Common symptoms of blood clots (DVT/PE) include: localized pain, swelling, calf tenderness, redness or discoloration of the skin. PE symptoms include: shortness of breath, rapid pulse, sweating, and chest pain that worsens with inspiration, coughing up blood, light headedness, feelings of apprehension. If you experience any of these symptoms call the office or go to the closest ER. Call 911 anytime you think you may need emergency care. For example, call if:    You passed out (lost consciousness). You have severe trouble breathing. You have sudden chest pain and shortness of breath, or you cough up blood. Call your doctor now or seek immediate medical care if:    You have signs of infection, such as: Increased pain, swelling, warmth, or redness. Red streaks leading from the incision. Pus draining from the incision. A fever over 101     Sharp calf pain     Your incision comes open and begins to bleed, or the bleeding increases. If you fall, especially if you are on blood thinners     You have pain that is not controlled with medications, rest, or ice         If you have a medical emergency please call 911 or seek immediate medical help. [facility] [place] [facility]  [address] [facility], [facility][note 3] [address]  [phone]      ANESTHESIA DISCHARGE INSTRUCTIONS    Wear your seatbelt home. You are under the influence of drugs-do not drink alcohol, drive, operate machinery, make any important decisions or sign any legal documents for 24 hours. Children should not ride bikes, [place] or play on gym sets for 24 hours after surgery. A responsible adult needs to be with you for 24 hours.   You may

## 2023-06-07 NOTE — OP NOTE
found to be satisfactory. The tourniquet was let down and meticulous hemostasis was achieved. The wound was bathed in betadine then thoroughly irrigated. Vancomycin powder was placed. The arthrotomy was closed with a #1 Vicryl in an interrupted fashion. The deep subcutaneous tissue was closed with 0 Vicryl in a running fashion and the subcutaneous layer was closed with 2-0 Vicryl in a running fashion. The skin was closed with monocryl and dermabond. A dry postoperative dressing was applied. The patient was then taken to the Recovery Room in comfortable and stable condition. ATTESTATION: Dr. Florencia Lou was present and scrubbed for the entire procedure.      Juan Fletcher MD

## 2023-06-07 NOTE — PROGRESS NOTES
Patient's  to bedside, patient starting to have pain to right knee, but still feels drowsy, declines pain medicine.

## 2023-06-07 NOTE — PROGRESS NOTES
[id number] [facility] Orthopedic Surgery   Progress Note    CHIEF COMPLAINT/DIAGNOSIS: S/p right Total Knee Arthroplasty    SUBJECTIVE: The patient is seen sitting up in the bed in PACU; describes mild knee pain; mild decreased sensation about the lateral edge of right foot. None proximal to this. Has a RW. Already received Rx but has not yet worked with therapy.  at bedside. 1 flight stairs to enter - open to home PT.     OBJECTIVE  Physical    VITALS:  BP (!) 144/90   Pulse 66   Temp 98.4 °F (36.9 °C) (Temporal)   Resp 15   Ht 5' 4.5\" (1.638 m)   Wt 141 lb 9.6 oz (64.2 kg)   LMP 12/01/2011   SpO2 100%   BMI 23.93 kg/m²     GENERAL: Alert and oriented x3, in no acute distress. MUSCULOSKELETAL: Able to dorsi and plantarflex the ankle without issue. INCISION:  Covered with post-op dressing/ACE is c/d/I.  ROM: right knee ROM deferred. Sensory:  Intact to light touch in peroneal and tibial distributions. Vascular:   2+ DP pulses with brisk cap refill;  calf soft and nontender    Data    ALL MEDICATIONS HAVE BEEN REVIEWED    CBC: No results for input(s): WBC, HGB, HCT, PLT in the last 72 hours. BMP:   Recent Labs     06/07/23  0709      K 4.2      CO2 26   BUN 18   CREATININE 0.6     INR: No results for input(s): INR in the last 72 hours. Post-op films show stable total knee arthroplasty construct without acute complication. ASSESSMENT:  S/p right Total Knee Arthroplasty (6/7/23), POD#0  LEONID  HTN    PLAN:   - WB status:  WBAT; reviewed post op precautions  - DVT prophylaxis: ASA 81mg BID x 30 days    - PT/OT  - Pain Control: OXy rx. Due to orthopaedic surgical procedure/condition, patient may require pain medication for up to 6-8 weeks.   - ID:  ANCEF x 1 doses post-op   - Dispo: home today with home PT    Follow-up with Dr. Basilia Rivera as scheduled on 6/26.  [phone]  Future Appointments   Date Time Provider Carmencita Kiran   6/26/2023  3:45 PM Ron Medina MD [facility]   9/11/2023

## 2023-06-07 NOTE — PROGRESS NOTES
Still waiting on PT/OT. Patient voided on bed pan. Tolerating PO - given percocet for pain.  remains at bedside. 2nd dose of ancef given.

## 2023-06-07 NOTE — ANESTHESIA PRE PROCEDURE
10:41 AM           Anesthesia Evaluation  Patient summary reviewed and Nursing notes reviewed  Airway: Mallampati: II          Dental:          Pulmonary:   (+) sleep apnea:                             Cardiovascular:  Exercise tolerance: good (>4 METS),   (+) hypertension:,                   Neuro/Psych:               GI/Hepatic/Renal:             Endo/Other:    (+) : arthritis:., .                 Abdominal:             Vascular:           Other Findings:           Anesthesia Plan      spinal, MAC and general     ASA 3                                   Goldie Palencia MD   6/7/2023

## 2023-06-07 NOTE — PROGRESS NOTES
Ok per PT/OT to [facility] home. Discharge instructions reviewed. IV removed. Patient discharged home.

## 2023-06-07 NOTE — DISCHARGE INSTR - COC
Continuity of Care Form    Patient Name: Gerda Jasso   :  1960  MRN:  [id number]    [facility] date:  2023  Discharge date:  ***    Code Status Order: No Order   Advance Directives:   Advance Care Flowsheet Documentation       Date/Time Healthcare Directive Type of Healthcare Directive Copy in [address] Agent's Name Healthcare Agent's Phone Number    [phone] No, patient does not have an advance directive for healthcare treatment -- -- -- -- --            Admitting Physician:  Agustin Geronimo MD  PCP: Jasbir Kurtz MD    Discharging Nurse: [facility] Unit/Room#: OR/NONE  Discharging Unit Phone Number: ***    Emergency Contact:   Extended Emergency Contact Information  Primary Emergency Contact: MaloneHarley  Address: [address] [place] [address] Drive           [address] Phone: [phone]  Mobile Phone: [phone]  Relation: Spouse  Secondary Emergency Contact: [address] [place] [address] Phone: [phone]  Relation: Child    Past Surgical History:  Past Surgical History:   Procedure Laterality Date    CLEFT PALATE REPAIR  1964 and [address]  2011    HYSTEROSCOPY,NOVASURE ABLATION    INNER EAR SURGERY Right 10/2022    Right ear mass    MANDIBLE SURGERY  1990    OVARY REMOVAL      and cyst    TOTAL KNEE ARTHROPLASTY  2011    Left       Immunization History:   Immunization History   Administered Date(s) Administered    INFLUENZA, INTRADERMAL, QUADRIVALENT, PRESERVATIVE FREE 2016    Influenza Virus Vaccine 10/06/2017, 2018, 10/04/2019, 10/02/2020, 2021    Influenza Whole 10/01/2011    TDaP, ADACEL (age 10y-63y), [address] (age 10y+), IM, 0.5mL 2014    Td, unspecified formulation 2009       Active Problems:  Patient Active Problem List   Diagnosis Code    Stickler syndrome Q89.8    Primary hypertension I10    Mixed hearing loss H90.8    S/P Patient with complaints of right flank pain, blood in urine and a black stool

## 2023-06-07 NOTE — CARE COORDINATION
[facility] aware of discharge. Orders sent to [facility] via direct link for start of care 6/8/23. Electronically signed by Samuel Johnson LPN on 8/8/58 at 2:14 PM EDT

## 2023-06-07 NOTE — PROGRESS NOTES
[facility]   Phone: [phone]    Occupational Therapy    [x] Initial Evaluation            [] Daily Treatment Note         [x] Discharge Summary      Patient: Giuseppe Mendoza   : 1960   MRN: [id number]   Date of Service:  2023    Admitting Diagnosis:  Right knee osteoarthritis  Current Admission Summary: Right total knee replacement  Past Medical History:  has a past medical history of Arthritis, Hypertension, Prolonged emergence from general anesthesia, and Stickler syndrome. Past Surgical History:  has a past surgical history that includes Total knee arthroplasty (2011); Ovary removal (); Dilation and curettage of uterus (2011); Cleft palate repair ( and ); Mandible surgery (1990); and Inner ear surgery (Right, 10/2022). Discharge Recommendations: Giuseppe Mendoza scored a 21/24 on the AM-PAC ADL Inpatient form. Current research shows that an AM-PAC score of 18 or greater is typically associated with a discharge to the patient's home setting. Based on the patient's AM-PAC score, and their current ADL deficits, it is recommended that the patient have 2-3 sessions per week of Occupational Therapy at d/c to increase the patient's independence. At this time, this patient demonstrates the endurance and safety to discharge home with home based OT (home vs OP services) and a follow up treatment frequency of 2-3x/wk. Please see assessment section for further patient specific details. If patient discharges prior to next session this note will serve as a discharge summary. Please see below for the latest assessment towards goals.    HOME HEALTH CARE: LEVEL 1 STANDARD    - Initial home health evaluation to occur within 24-48 hours, in patient home   - Therapy to evaluate with goal of regaining prior level of functioning   - Therapy to evaluate if patient has [address] needs for personal care      DME Required

## 2023-06-07 NOTE — PROGRESS NOTES
[facility] [facility]   Phone: [phone]    Physical Therapy    [x] Initial Evaluation            [] Daily Treatment Note         [] Discharge Summary      Patient: Naresh Byrnes   : 1960   MRN: [id number]   Date of Service:  2023  Admitting Diagnosis: RIGHT knee osteoarthritis  Current Admission Summary: Pt s/p R TKA 23. Past Medical History:  has a past medical history of Arthritis, Hypertension, Prolonged emergence from general anesthesia, and Stickler syndrome. Past Surgical History:  has a past surgical history that includes Total knee arthroplasty (2011); Ovary removal (); Dilation and curettage of uterus (2011); Cleft palate repair ( and ); Mandible surgery (1990); and Inner ear surgery (Right, 10/2022). Discharge Recommendations: Naresh Byrnes scored a 20/24 on the AM-PAC short mobility form. Current research shows that an AM-PAC score of 18 or greater is typically associated with a discharge to the patient's home setting. Based on the patient's AM-PAC score and their current functional mobility deficits, it is recommended that the patient have 2-3 sessions per week of Physical Therapy at d/c to increase the patient's independence. At this time, this patient demonstrates the endurance and safety to discharge home with HHPT and a follow up treatment frequency of 2-3x/wk. Please see assessment section for further patient specific details.     HOME HEALTH CARE: LEVEL 3 SAFETY  - Initial home health evaluation to occur within 24-48 hours, in patient home   - Therapy evaluations in home within 24-48 hours of discharge; including DME and home safety   - Frontload therapy 5 days, then 3x a week   - Therapy to evaluate if patient has [address] needs for personal care   -  evaluation within 24-48 hours, includes evaluation of resources and insurance to determine [place], [place], LTC, and Medicaid options

## 2023-06-07 NOTE — ANESTHESIA PROCEDURE NOTES
Spinal Block    Patient location during procedure: OR  End time: 6/7/2023 7:35 AM  Reason for block: primary anesthetic  Staffing  Performed: other anesthesia staff   Other anesthesia staff: Tod Ritter RN  Spinal Block  Patient position: sitting  Prep: Betadine  Patient monitoring: continuous pulse ox  Approach: midline  Location: L3/L4  Provider prep: mask and sterile gloves  Local infiltration: lidocaine  Needle  Needle type: Pencan   Needle gauge: 25 G  Needle length: 3.5 in  Assessment  Sensory level: T10  Swirl obtained: Yes  CSF: clear  Attempts: 1  Hemodynamics: stable  Additional Notes  Placed by STEPHANIE CUETO  Preanesthetic Checklist  Completed: patient identified, IV checked, site marked, risks and benefits discussed, surgical/procedural consents, equipment checked, pre-op evaluation, timeout performed, anesthesia consent given, oxygen available, monitors applied/VS acknowledged, fire risk safety assessment completed and verbalized and blood product R/B/A discussed and consented

## 2023-06-07 NOTE — PROGRESS NOTES
Phase 1 recovery has been completed. Edwar Echevarria NP called for discharge orders, PT/OT, etc. Patient family updated.

## 2023-06-07 NOTE — INTERVAL H&P NOTE
Update History & Physical    The patient's History and Physical of May 18, 2023 was reviewed with the patient and I examined the patient. There was no change. The surgical site was confirmed by the patient and me. Plan: The risks, benefits, expected outcome, and alternative to the recommended procedure have been discussed with the patient. Patient understands and wants to proceed with the procedure.      Electronically signed by Devorah Schwartz MD on 6/7/2023 at 7:10 AM

## 2023-06-07 NOTE — ANESTHESIA POSTPROCEDURE EVALUATION
Department of Anesthesiology  Postprocedure Note    Patient: Yennifer Kilpatrick  MRN: [id number]  YOB: 1960  Date of evaluation: 6/7/2023      Procedure Summary     Date: 06/07/23 Room / Location: [address]    Anesthesia Start: 1414 Anesthesia Stop: 5713    Procedure: RIGHT TOTAL KNEE REPLACEMENT - Alisia Pancoast CEMENTED (Right: Knee) Diagnosis:       Arthritis of right knee      (Osteoarthritis of right knee, unspecified osteoarthritis type [M17.11])    Surgeons: Princess Goldstein MD Responsible Provider: Julienne Rankin MD    Anesthesia Type: spinal, MAC, general ASA Status: 3          Anesthesia Type: No value filed.     Aaron Phase I: Aaron Score: 7    Aaron Phase II:        Anesthesia Post Evaluation    Patient location during evaluation: PACU  Patient participation: complete - patient participated  Level of consciousness: awake  Airway patency: patent  Nausea & Vomiting: no vomiting and no nausea  Complications: no  Cardiovascular status: hemodynamically stable  Respiratory status: acceptable  Hydration status: stable  Multimodal analgesia pain management approach

## 2023-06-07 NOTE — PROGRESS NOTES
Patient to PACU from OR. Drowsy. VSS. Surgical dressing to right leg c/d/I. + 1 bilateral pedal pulses.

## 2023-06-08 ENCOUNTER — TELEPHONE (OUTPATIENT)
Dept: ORTHOPEDIC SURGERY | Age: 63
End: 2023-06-08

## 2023-06-08 NOTE — TELEPHONE ENCOUNTER
I spoke with pt who is asking for a referral to out pt PT. She is  one day S/P RTKA. She is doing in home PT for 2 weeks and would like to get the referral now so there is no gap in her PT. I did mention that Dr Bard Montiel discusses out pt PT at the 1st PO. Since she is only one day PO I asked her to please check in with me next week to see how she's with the in home PT. If she feels she is doing well I will send the referral to [facility] at that. Pt stated she will call call next week with a check in.  Number to my VM was given

## 2023-06-20 ENCOUNTER — TELEPHONE (OUTPATIENT)
Dept: ORTHOPEDIC SURGERY | Age: 63
End: 2023-06-20

## 2023-06-20 NOTE — TELEPHONE ENCOUNTER
General Question     Subject: PT ORDER FOR HOME CARE  Patient and /or Facility Request: Garcia Zimmerman  Contact Number: [phone]      NI FROM [facility] CALLED IN TO SEE IF SHE CAN GET VERBAL ORDERS FOR PT HOME CARE FOR R KNEE. Syeda Harrison     PLEASE ADVISE

## 2023-06-22 DIAGNOSIS — Z47.89 AFTERCARE FOLLOWING SURGERY OF THE MUSCULOSKELETAL SYSTEM: ICD-10-CM

## 2023-06-22 RX ORDER — OXYCODONE HYDROCHLORIDE AND ACETAMINOPHEN 5; 325 MG/1; MG/1
1 TABLET ORAL EVERY 6 HOURS PRN
Qty: 28 TABLET | Refills: 0 | Status: SHIPPED | OUTPATIENT
Start: 2023-06-22 | End: 2023-06-30 | Stop reason: SDUPTHER

## 2023-06-22 NOTE — TELEPHONE ENCOUNTER
Prescription Refill     Medication Name:  PAIN MED PERCOCET   Pharmacy: [facility] [address] [phone]  Patient Contact Number:  [phone]     8 TABS - ENOUGH FOR TODAY AND TOMORROW, BUT WILL BE OUT ON WEEKEND. PLEASE SEND IN REFILL.

## 2023-06-26 ENCOUNTER — OFFICE VISIT (OUTPATIENT)
Dept: ORTHOPEDIC SURGERY | Age: 63
End: 2023-06-26

## 2023-06-26 VITALS — HEIGHT: 65 IN | BODY MASS INDEX: 23.49 KG/M2 | WEIGHT: 141 LBS

## 2023-06-26 DIAGNOSIS — Z96.652 S/P TOTAL KNEE ARTHROPLASTY, LEFT: Primary | ICD-10-CM

## 2023-06-26 DIAGNOSIS — Z96.651 S/P TOTAL KNEE ARTHROPLASTY, RIGHT: ICD-10-CM

## 2023-06-26 PROCEDURE — 99024 POSTOP FOLLOW-UP VISIT: CPT | Performed by: ORTHOPAEDIC SURGERY

## 2023-06-27 ENCOUNTER — HOSPITAL ENCOUNTER (OUTPATIENT)
Dept: PHYSICAL THERAPY | Age: 63
Setting detail: THERAPIES SERIES
Discharge: HOME | End: 2023-06-27
Payer: COMMERCIAL

## 2023-06-27 PROCEDURE — 97161 PT EVAL LOW COMPLEX 20 MIN: CPT

## 2023-06-27 PROCEDURE — 97112 NEUROMUSCULAR REEDUCATION: CPT

## 2023-06-27 PROCEDURE — 97110 THERAPEUTIC EXERCISES: CPT

## 2023-06-27 PROCEDURE — 97016 VASOPNEUMATIC DEVICE THERAPY: CPT

## 2023-06-29 ENCOUNTER — HOSPITAL ENCOUNTER (OUTPATIENT)
Dept: PHYSICAL THERAPY | Age: 63
Setting detail: THERAPIES SERIES
Discharge: HOME | End: 2023-06-29
Payer: COMMERCIAL

## 2023-06-29 DIAGNOSIS — Z47.89 AFTERCARE FOLLOWING SURGERY OF THE MUSCULOSKELETAL SYSTEM: ICD-10-CM

## 2023-06-29 PROCEDURE — 97140 MANUAL THERAPY 1/> REGIONS: CPT

## 2023-06-29 PROCEDURE — 97016 VASOPNEUMATIC DEVICE THERAPY: CPT

## 2023-06-29 PROCEDURE — 97112 NEUROMUSCULAR REEDUCATION: CPT

## 2023-06-29 PROCEDURE — 97110 THERAPEUTIC EXERCISES: CPT

## 2023-06-30 DIAGNOSIS — Z47.89 AFTERCARE FOLLOWING SURGERY OF THE MUSCULOSKELETAL SYSTEM: ICD-10-CM

## 2023-06-30 RX ORDER — OXYCODONE HYDROCHLORIDE AND ACETAMINOPHEN 5; 325 MG/1; MG/1
1 TABLET ORAL EVERY 6 HOURS PRN
Qty: 28 TABLET | Refills: 0 | Status: SHIPPED | OUTPATIENT
Start: 2023-06-30 | End: 2023-07-07

## 2023-07-05 ENCOUNTER — HOSPITAL ENCOUNTER (OUTPATIENT)
Dept: PHYSICAL THERAPY | Age: 63
Setting detail: THERAPIES SERIES
Discharge: HOME | End: 2023-07-05
Payer: MEDICARE

## 2023-07-05 PROCEDURE — 97112 NEUROMUSCULAR REEDUCATION: CPT

## 2023-07-05 PROCEDURE — 97110 THERAPEUTIC EXERCISES: CPT

## 2023-07-05 PROCEDURE — 97140 MANUAL THERAPY 1/> REGIONS: CPT

## 2023-07-05 NOTE — FLOWSHEET NOTE
[address] [place] [address]  Phone: [phone]   Fax: [phone]    Physical Therapy Treatment Note/ Progress Report:     Date:  2023    Patient Name:  Nick Wheeler    :  1960  MRN: [id number]  Restrictions/Precautions:    Medical/Treatment Diagnosis Information:  Diagnosis: M17.11 (ICD-10-CM) - Primary osteoarthritis of right knee  Treatment Diagnosis: M25.561    Pain in right knee, R60.9  Edema, unspecified  Insurance/Certification information:  PT Insurance Information: [facility]  Physician Information:  Referring Provider (secondary): Dr. Deanne Choe  Has the plan of care been signed (Y/N):        []  Yes  [x]  No     Date of Patient follow up with Physician:       Is this a Progress Report:     []  Yes  [x]  No        If Yes:  Date Range for reporting period:  Beginnin/27  Ending    Progress report will be due (10 Rx or 30 days whichever is less):        Recertification will be due (POC Duration  / 90 days whichever is less): see above         Visit # Insurance Allowable Auth Required   3 20 VPCY  []  Yes [x]  No        Functional Scale:     OUTCOME MEASURE DATE DEFICIT   WOMAC  IE 54.2% Deficit         Latex Allergy:  [x]NO      []YES  Preferred Language for Healthcare:   [x]English       []other:      Pain level:  4/10     SUBJECTIVE:  Patient states her knee was very sore after the evaluation. She states she has not completed her HEP due to the pain. : Sleeping better. Stiff in the morning, but some days are a lot worse than others.      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

## 2023-07-11 ENCOUNTER — HOSPITAL ENCOUNTER (OUTPATIENT)
Dept: PHYSICAL THERAPY | Age: 63
Setting detail: THERAPIES SERIES
Discharge: HOME | End: 2023-07-11
Payer: MEDICARE

## 2023-07-11 PROCEDURE — 97110 THERAPEUTIC EXERCISES: CPT

## 2023-07-11 PROCEDURE — 97112 NEUROMUSCULAR REEDUCATION: CPT

## 2023-07-11 NOTE — FLOWSHEET NOTE
progression per patient tolerance, in order to prevent re-injury. [] Progressing: [] Met: [] Not Met: [] Adjusted   2. Patient will have a decrease in pain to facilitate improvement in movement, function, and ADLs as indicated by Functional Deficits. [] Progressing: [] Met: [] Not Met: [] Adjusted     Long Term Goals: To be achieved in: 10 weeks  1. Disability index score of 27% or less for the [facility] to assist with reaching prior level of function. [] Progressing: [] Met: [] Not Met: [] Adjusted  2. Patient will demonstrate increased AROM to [facility] and pain free to allow for proper joint functioning as indicated by patients Functional Deficits. [] Progressing: [] Met: [] Not Met: [] Adjusted  3. Patient will demonstrate an increase in knee strength to 5/5 in LE to allow for proper functional mobility as indicated by patients Functional Deficits. [] Progressing: [] Met: [] Not Met: [] Adjusted  4. Patient will be able to complete one flight of stair negotiation without increased symptoms or restriction. [] Progressing: [] Met: [] Not Met: [] Adjusted  5. Patient will be able to ambulate 30 minutes without increased symptoms or restriction. [] Progressing: [] Met: [] Not Met: [] Adjusted            Overall Progression Towards Functional goals/ Treatment Progress Update:  [] Patient is progressing as expected towards functional goals listed. [] Progression is slowed due to complexities/Impairments listed. [] Progression has been slowed due to co-morbidities.   [x] Plan just implemented, too soon to assess goals progression <30days   [] Goals require adjustment due to lack of progress  [] Patient is not progressing as expected and requires additional follow up with physician  [] Other    Prognosis for POC: [x] Good [] Fair  [] Poor      Patient requires continued skilled intervention: [x] Yes  [] No    Treatment/Activity Tolerance:  [x] Patient tolerated treatment well [] Patient limited by

## 2023-07-13 ENCOUNTER — HOSPITAL ENCOUNTER (OUTPATIENT)
Dept: PHYSICAL THERAPY | Age: 63
Setting detail: THERAPIES SERIES
Discharge: HOME | End: 2023-07-13
Payer: MEDICARE

## 2023-07-13 PROCEDURE — 97112 NEUROMUSCULAR REEDUCATION: CPT

## 2023-07-13 PROCEDURE — 97110 THERAPEUTIC EXERCISES: CPT

## 2023-07-13 NOTE — FLOWSHEET NOTE
pain  [] Patient limited by other medical complications  [x] Other: Patient with fair tolerance to session. Low tolerance for extension based activities, demonstrating continued motion deficit of lacking 3 deg of extension. Patient educated to continue with HEP completion with focus on extension and quad activation in order to ease ADL completion and return the patient to her pain free PLOF. Patient education: HEP, POC    PLAN: See eval  [] Continue per plan of care [] Alter current plan (see comments above)  [x] Plan of care initiated [] Hold pending MD visit [] Discharge      Electronically signed by:  Layton Teran, PT, DPT      Note: If patient does not return for scheduled/ recommended follow up visits, this note will serve as a discharge from care along with most recent update on progress.

## 2023-07-18 ENCOUNTER — HOSPITAL ENCOUNTER (OUTPATIENT)
Dept: PHYSICAL THERAPY | Age: 63
Setting detail: THERAPIES SERIES
Discharge: HOME | End: 2023-07-18
Payer: MEDICARE

## 2023-07-18 DIAGNOSIS — Z47.89 AFTERCARE FOLLOWING SURGERY OF THE MUSCULOSKELETAL SYSTEM: ICD-10-CM

## 2023-07-18 PROCEDURE — 97110 THERAPEUTIC EXERCISES: CPT

## 2023-07-18 PROCEDURE — 97112 NEUROMUSCULAR REEDUCATION: CPT

## 2023-07-18 PROCEDURE — 97530 THERAPEUTIC ACTIVITIES: CPT

## 2023-07-18 RX ORDER — OXYCODONE HYDROCHLORIDE AND ACETAMINOPHEN 5; 325 MG/1; MG/1
1 TABLET ORAL EVERY 6 HOURS PRN
Qty: 28 TABLET | Refills: 0 | Status: SHIPPED | OUTPATIENT
Start: 2023-07-18 | End: 2023-07-25

## 2023-07-18 NOTE — TELEPHONE ENCOUNTER
Prescription Refill     Medication Name:  [address]: [facility]  Patient Contact Number:  [phone]      The pt needs a refill on her Percocet. She still needs to take them on her therapy days and sometimes at night. The pt goes to [facility].   Her knees swells up a lot from therapy and it \"messes me up so bad\"  it's so painful and she can't sleep that night

## 2023-07-20 ENCOUNTER — HOSPITAL ENCOUNTER (OUTPATIENT)
Dept: PHYSICAL THERAPY | Age: 63
Setting detail: THERAPIES SERIES
Discharge: HOME | End: 2023-07-20
Payer: MEDICARE

## 2023-07-20 PROCEDURE — 97530 THERAPEUTIC ACTIVITIES: CPT

## 2023-07-20 PROCEDURE — 97112 NEUROMUSCULAR REEDUCATION: CPT

## 2023-07-20 PROCEDURE — 97110 THERAPEUTIC EXERCISES: CPT

## 2023-07-20 NOTE — FLOWSHEET NOTE
[address] [place] [address]  Phone: [phone]   Fax: [phone]    Physical Therapy Treatment Note/ Progress Report:     Date:  2023    Patient Name:  Rakesh Conroy    :  1960  MRN: [id number]  Restrictions/Precautions:    Medical/Treatment Diagnosis Information:  Diagnosis: M17.11 (ICD-10-CM) - Primary osteoarthritis of right knee  Treatment Diagnosis: M25.561    Pain in right knee, R60.9  Edema, unspecified  Insurance/Certification information:  PT Insurance Information: [facility]  Physician Information:  Referring Provider (secondary): Dr. Luis Alberto Aguilar  Has the plan of care been signed (Y/N):        [x]  Yes  []  No     Date of Patient follow up with Physician:       Is this a Progress Report:     []  Yes  [x]  No        If Yes:  Date Range for reporting period:  Beginnin/27  Ending    Progress report will be due (10 Rx or 30 days whichever is less):        Recertification will be due (POC Duration  / 90 days whichever is less): see above         Visit # Insurance Allowable Auth Required   7 20 VPCY  []  Yes [x]  No        Functional Scale:     OUTCOME MEASURE DATE DEFICIT   WOMAC  IE 54.2% Deficit         Latex Allergy:  [x]NO      []YES  Preferred Language for Healthcare:   [x]English       []other:      Pain level:   2/10 at rest    SUBJECTIVE: Patient states she is sore and stiff this morning. She woke up at midnight last night to take pain medication. She reports she woke up with lateral swelling.           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

## 2023-07-24 ENCOUNTER — OFFICE VISIT (OUTPATIENT)
Dept: ORTHOPEDIC SURGERY | Age: 63
End: 2023-07-24

## 2023-07-24 DIAGNOSIS — Z47.89 AFTERCARE FOLLOWING SURGERY OF THE MUSCULOSKELETAL SYSTEM: Primary | ICD-10-CM

## 2023-07-24 PROCEDURE — 99024 POSTOP FOLLOW-UP VISIT: CPT | Performed by: ORTHOPAEDIC SURGERY

## 2023-07-24 RX ORDER — AMOXICILLIN 500 MG/1
CAPSULE ORAL
Qty: 4 CAPSULE | Refills: 0 | Status: SHIPPED | OUTPATIENT
Start: 2023-07-24

## 2023-07-24 NOTE — PROGRESS NOTES
Patient: Jose Elias Piña                  : 1960   MRN: [id number]   Date of Visit: 23     Physician: Los Blanchard MD.     Reason for Visit: Status post TKA     Subjective History of Present Illness:     Jose Elias Piña is here for regularly scheduled follow-up s/p R TKA. Reports they are doing well, occasional aches and pains are controlled with over the counter medications. They do not report fevers, chills or drainage from the incision site    Physical Examination??: ?   General: Patient is alert and oriented x 3 and appears comfortable. Incision is well-approximated, no erythema, fluctuance   Able to perform SLR   ROM R 3-120   SILT throughout LE     Radiographs: AP/lateral of the operative knee with well-positioned total knee arthroplasty. No evidence of fracture subluxation or dislocation. No evidence of migration or loosening. Multiple views of the left knee AP/lateral, sunrise: well-positioned implants, there is slight lateral tracking of the patella    Assessment and Plan?: The patient is progressing well approximately 6 weeks s/p TKA     1. A thorough discussion was had with the patient concerning the ?postoperative course and the patient is in agreement with the plan. 2. They will continue to wean from pain medications and progress weight bearing    3. Discussed the need to avoid invasive procedures within 3 months of the operative procedure and the need for antibiotics prior to dental procedures at least for 1 years after arthroplasty.   4. Will see the patient in 6 months, no xrays if she is doing well.  _______________________      Los Blanchard MD

## 2023-07-25 ENCOUNTER — HOSPITAL ENCOUNTER (OUTPATIENT)
Dept: PHYSICAL THERAPY | Age: 63
Setting detail: THERAPIES SERIES
Discharge: HOME | End: 2023-07-25
Payer: MEDICARE

## 2023-07-25 PROCEDURE — 97530 THERAPEUTIC ACTIVITIES: CPT

## 2023-07-25 PROCEDURE — 97110 THERAPEUTIC EXERCISES: CPT

## 2023-07-25 PROCEDURE — 97112 NEUROMUSCULAR REEDUCATION: CPT

## 2023-07-27 ENCOUNTER — HOSPITAL ENCOUNTER (OUTPATIENT)
Dept: PHYSICAL THERAPY | Age: 63
Setting detail: THERAPIES SERIES
Discharge: HOME | End: 2023-07-27
Payer: MEDICARE

## 2023-07-27 PROCEDURE — 97530 THERAPEUTIC ACTIVITIES: CPT

## 2023-07-27 PROCEDURE — 97112 NEUROMUSCULAR REEDUCATION: CPT

## 2023-07-27 PROCEDURE — 97110 THERAPEUTIC EXERCISES: CPT

## 2023-09-11 ENCOUNTER — HOSPITAL ENCOUNTER (OUTPATIENT)
Dept: WOMENS IMAGING | Age: 63
Discharge: HOME | End: 2023-09-11
Payer: MEDICARE

## 2023-09-11 VITALS — WEIGHT: 135 LBS | HEIGHT: 64 IN | BODY MASS INDEX: 23.05 KG/M2

## 2023-09-11 DIAGNOSIS — Z12.31 BREAST CANCER SCREENING BY MAMMOGRAM: ICD-10-CM

## 2023-09-11 PROCEDURE — 77063 BREAST TOMOSYNTHESIS BI: CPT

## 2024-01-22 ENCOUNTER — OFFICE VISIT (OUTPATIENT)
Dept: ORTHOPEDIC SURGERY | Age: 64
End: 2024-01-22
Payer: MEDICARE

## 2024-01-22 VITALS — BODY MASS INDEX: 23.05 KG/M2 | HEIGHT: 64 IN | WEIGHT: 135 LBS

## 2024-01-22 DIAGNOSIS — M25.561 RIGHT KNEE PAIN, UNSPECIFIED CHRONICITY: Primary | ICD-10-CM

## 2024-01-22 DIAGNOSIS — F33.1 MAJOR DEPRESSIVE DISORDER, RECURRENT, MODERATE (HCC): ICD-10-CM

## 2024-01-22 DIAGNOSIS — F33.0 MAJOR DEPRESSIVE DISORDER, RECURRENT, MILD (HCC): ICD-10-CM

## 2024-01-22 DIAGNOSIS — Z96.651 S/P TOTAL KNEE ARTHROPLASTY, RIGHT: ICD-10-CM

## 2024-01-22 PROBLEM — F33.9 MAJOR DEPRESSIVE DISORDER, RECURRENT, UNSPECIFIED (HCC): Status: ACTIVE | Noted: 2024-01-22

## 2024-01-22 PROCEDURE — 99213 OFFICE O/P EST LOW 20 MIN: CPT | Performed by: ORTHOPAEDIC SURGERY

## 2024-01-22 RX ORDER — MELOXICAM 15 MG/1
15 TABLET ORAL DAILY
Qty: 14 TABLET | Refills: 0 | Status: SHIPPED | OUTPATIENT
Start: 2024-01-22 | End: 2024-02-05

## 2024-01-22 NOTE — PROGRESS NOTES
Patient: Isis Malone                  : 1960   MRN: [id number]   Date of Visit: 24     Physician: Abner La MD.     Reason for Visit: Status post TKA     Subjective History of Present Illness:     Isis Malone is here for regularly scheduled follow-up s/p R TKA. Reports they are doing well, occasional aches and pains are controlled with over the counter medications. They do not report fevers, chills or drainage from the incision site    She is reporting several days of tenderness noted over the lateral side of the knee and anterior lateral knee.    Physical Examination??: ?   General: Patient is alert and oriented x 3 and appears comfortable. Small effusion  Incision is well-approximated, no erythema, fluctuance   Able to perform SLR, there is tenderness noted over the lateral facet of the patella  ROM R 0-120   SILT throughout LE     Radiographs: AP/lateral of the operative knee with well-positioned total knee arthroplasty. No evidence of fracture subluxation or dislocation. No evidence of migration or loosening.      Multiple views of the left knee AP/lateral, sunrise: well-positioned implants, there is slight lateral tracking of the patella    Assessment and Plan?: The patient is progressing well approximately 6 months s/p TKA     She is having some lateral knee pain either IT band versus lateral facet pain.  Either way this is new pain    1. A thorough discussion was had with the patient concerning the ?postoperative course and the patient is in agreement with the plan.   2.  I provided her an exercise sheet for the IT band  3.  Additionally provided her a prescription for meloxicam.  4. Will see the patient in 6 months, no xrays if she is doing well.  _______________________      Abner La MD

## 2024-01-22 NOTE — PROGRESS NOTES
Patient: Isis Malone                  : 1960   MRN: [id number]   Date of Visit: 24     Physician: Abner La MD.     Reason for Visit: Status post TKA     Subjective History of Present Illness:     Isis Malone is here for regularly scheduled follow-up s/p R TKA. Reports they are doing well, occasional aches and pains are controlled with over the counter medications. They do not report fevers, chills or drainage from the incision site    Physical Examination??: ?   General: Patient is alert and oriented x 3 and appears comfortable.   Incision is well-approximated, no erythema, fluctuance   Able to perform SLR   ROM R 3-120   SILT throughout LE     Radiographs: AP/lateral of the operative knee with well-positioned total knee arthroplasty. No evidence of fracture subluxation or dislocation. No evidence of migration or loosening.      Multiple views of the left knee AP/lateral, sunrise: well-positioned implants, there is slight lateral tracking of the patella    Assessment and Plan?: The patient is progressing well approximately 6 weeks s/p TKA     1. A thorough discussion was had with the patient concerning the ?postoperative course and the patient is in agreement with the plan.   2. They will continue to wean from pain medications and progress weight bearing    3. Discussed the need to avoid invasive procedures within 3 months of the operative procedure and the need for antibiotics prior to dental procedures at least for 1 years after arthroplasty.  4. Will see the patient in 6 months, no xrays if she is doing well.  _______________________      Abner La MD

## 2024-03-01 ENCOUNTER — TELEPHONE (OUTPATIENT)
Dept: ORTHOPEDIC SURGERY | Age: 64
End: 2024-03-01

## 2024-03-01 DIAGNOSIS — Z96.651 S/P TOTAL KNEE ARTHROPLASTY, RIGHT: Primary | ICD-10-CM

## 2024-03-01 RX ORDER — AMOXICILLIN 500 MG/1
500 CAPSULE ORAL ONCE AS NEEDED
Qty: 4 CAPSULE | Refills: 0 | Status: SHIPPED | OUTPATIENT
Start: 2024-03-01

## 2024-03-01 NOTE — TELEPHONE ENCOUNTER
I spoke with the pt who stated she had a dental appt yesterday and is wondering if she needs antibiotics. I told her that Dr arreguin is sending her a script for amoxicillin.She will take 4 pills at once     I did explain that she only needs antibiotics for the first year with a joint implant

## 2024-03-01 NOTE — TELEPHONE ENCOUNTER
Prescription Refill     Medication Name:  Had a dental appt yesterday and is wondering if she needs antibiotics. Please advise.  Pharmacy: [facility]  Patient Contact Number:  [phone]

## 2024-06-22 DIAGNOSIS — Z96.651 S/P TOTAL KNEE ARTHROPLASTY, RIGHT: ICD-10-CM

## 2024-06-24 RX ORDER — AMOXICILLIN 500 MG/1
CAPSULE ORAL
Qty: 4 CAPSULE | Refills: 0 | Status: SHIPPED | OUTPATIENT
Start: 2024-06-24 | End: 2024-06-27

## 2024-06-25 DIAGNOSIS — Z96.651 S/P TOTAL KNEE ARTHROPLASTY, RIGHT: ICD-10-CM

## 2024-06-27 RX ORDER — AMOXICILLIN 500 MG/1
CAPSULE ORAL
Qty: 4 CAPSULE | Refills: 0 | Status: SHIPPED | OUTPATIENT
Start: 2024-06-27

## 2024-07-15 ENCOUNTER — OFFICE VISIT (OUTPATIENT)
Dept: ORTHOPEDIC SURGERY | Age: 64
End: 2024-07-15
Payer: MEDICARE

## 2024-07-15 VITALS — HEIGHT: 64 IN | BODY MASS INDEX: 22.53 KG/M2 | WEIGHT: 132 LBS

## 2024-07-15 DIAGNOSIS — M25.551 BILATERAL HIP PAIN: Primary | ICD-10-CM

## 2024-07-15 DIAGNOSIS — M25.552 BILATERAL HIP PAIN: Primary | ICD-10-CM

## 2024-07-15 DIAGNOSIS — M16.12 PRIMARY OSTEOARTHRITIS OF LEFT HIP: ICD-10-CM

## 2024-07-15 PROCEDURE — 99214 OFFICE O/P EST MOD 30 MIN: CPT | Performed by: ORTHOPAEDIC SURGERY

## 2024-07-16 RX ORDER — SODIUM CHLORIDE 0.9 % (FLUSH) 0.9 %
5-40 SYRINGE (ML) INJECTION PRN
OUTPATIENT
Start: 2024-07-16

## 2024-07-16 RX ORDER — SODIUM CHLORIDE 0.9 % (FLUSH) 0.9 %
5-40 SYRINGE (ML) INJECTION EVERY 12 HOURS SCHEDULED
OUTPATIENT
Start: 2024-07-16

## 2024-07-16 RX ORDER — SODIUM CHLORIDE 9 MG/ML
INJECTION, SOLUTION INTRAVENOUS PRN
OUTPATIENT
Start: 2024-07-16

## 2024-07-16 RX ORDER — ACETAMINOPHEN 325 MG/1
1000 TABLET ORAL ONCE
OUTPATIENT
Start: 2024-07-16 | End: 2024-07-16

## 2024-07-16 RX ORDER — CELECOXIB 200 MG/1
400 CAPSULE ORAL ONCE
OUTPATIENT
Start: 2024-07-16 | End: 2024-07-16

## 2024-07-16 NOTE — PROGRESS NOTES
Patient: Isis Malone  : 1960    MRN: [id number]    Date of Visit: 7/15/24    Attending Physician: Abner La    History of Present Illness  Ms.. Malone is a very pleasant 63 y.o. patient with a several year history of progressive LEFT hip pain. There is no precipitating event or trauma. The pain is located predominantly in the groin and buttock and aggravated by weight bearing. Walking even short distances can be painful. However, it can also awaken the patient at night. Significant loss in range of motion making it difficult to cross legs and don socks/shoes.     The patient has tried the following interventions without sustained functional improvement:    Structured exercise/physical therapy program  NSAIDs and or tylenol   Cane in the contralateral hand  Intra-articular steroid injections    Quality of life is negatively impacted with daily tasks being more difficult. The patient would like to talk about surgical treatment options.      PMH/PSH:  Past Medical History:   Diagnosis Date    Arthritis     lt knee/hands    Hypertension     Prolonged emergence from general anesthesia     Stickler syndrome     Cleft palate, hearing loss, arthritis     Patient Active Problem List   Diagnosis    Stickler syndrome    Primary hypertension    Mixed hearing loss    S/P total knee arthroplasty, left 3.25.11    Effusion of left knee    LEONID (obstructive sleep apnea)    Primary osteoarthritis of right knee    Major depressive disorder, recurrent, mild    Major depressive disorder, recurrent, moderate    Major depressive disorder, recurrent, unspecified    Primary osteoarthritis of left hip     Past Surgical History:   Procedure Laterality Date    CLEFT PALATE REPAIR   and     COLONOSCOPY  2014    DILATION AND CURETTAGE OF UTERUS  2011    HYSTEROSCOPY,NOVASURE ABLATION    INNER EAR SURGERY Right 10/2022    Right ear mass    MANDIBLE SURGERY  1990    OVARY REMOVAL      and

## 2024-09-12 ENCOUNTER — HOSPITAL ENCOUNTER (OUTPATIENT)
Age: 64
Discharge: HOME | End: 2024-09-12
Payer: MEDICARE

## 2024-09-12 DIAGNOSIS — M25.551 BILATERAL HIP PAIN: ICD-10-CM

## 2024-09-12 DIAGNOSIS — M25.552 BILATERAL HIP PAIN: ICD-10-CM

## 2024-09-12 LAB
25(OH)D3 SERPL-MCNC: 53.3 NG/ML
ALBUMIN SERPL-MCNC: 4.4 G/DL (ref 3.4–5)
ALBUMIN/GLOB SERPL: 1.7 {RATIO} (ref 1.1–2.2)
ALP SERPL-CCNC: 69 U/L (ref 40–129)
ALT SERPL-CCNC: 10 U/L (ref 10–40)
ANION GAP SERPL CALCULATED.3IONS-SCNC: 10 MMOL/L (ref 3–16)
APTT BLD: 29.1 SEC (ref 22.1–36.4)
AST SERPL-CCNC: 20 U/L (ref 15–37)
BASOPHILS # BLD: 0 K/UL (ref 0–0.2)
BASOPHILS NFR BLD: 0.8 %
BILIRUB SERPL-MCNC: 0.3 MG/DL (ref 0–1)
BUN SERPL-MCNC: 18 MG/DL (ref 7–20)
CALCIUM SERPL-MCNC: 10 MG/DL (ref 8.3–10.6)
CHLORIDE SERPL-SCNC: 103 MMOL/L (ref 99–110)
CO2 SERPL-SCNC: 27 MMOL/L (ref 21–32)
CREAT SERPL-MCNC: 0.8 MG/DL (ref 0.6–1.2)
DEPRECATED RDW RBC AUTO: 12.6 % (ref 12.4–15.4)
EKG ATRIAL RATE: 64 BPM
EKG DIAGNOSIS: NORMAL
EKG P AXIS: 13 DEGREES
EKG P-R INTERVAL: 158 MS
EKG Q-T INTERVAL: 416 MS
EKG QRS DURATION: 80 MS
EKG QTC CALCULATION (BAZETT): 429 MS
EKG R AXIS: 61 DEGREES
EKG T AXIS: 51 DEGREES
EKG VENTRICULAR RATE: 64 BPM
EOSINOPHIL # BLD: 0.2 K/UL (ref 0–0.6)
EOSINOPHIL NFR BLD: 3.2 %
EST. AVERAGE GLUCOSE BLD GHB EST-MCNC: 88.2 MG/DL
GFR SERPLBLD CREATININE-BSD FMLA CKD-EPI: 83 ML/MIN/{1.73_M2}
GLUCOSE SERPL-MCNC: 78 MG/DL (ref 70–99)
HBA1C MFR BLD: 4.7 %
HCT VFR BLD AUTO: 37.6 % (ref 36–48)
HGB BLD-MCNC: 13.3 G/DL (ref 12–16)
INR PPP: 1.15 (ref 0.85–1.15)
LYMPHOCYTES # BLD: 1.4 K/UL (ref 1–5.1)
LYMPHOCYTES NFR BLD: 28.5 %
MCH RBC QN AUTO: 30.7 PG (ref 26–34)
MCHC RBC AUTO-ENTMCNC: 35.2 G/DL (ref 31–36)
MCV RBC AUTO: 87.2 FL (ref 80–100)
MONOCYTES # BLD: 0.3 K/UL (ref 0–1.3)
MONOCYTES NFR BLD: 6.7 %
NEUTROPHILS # BLD: 3 K/UL (ref 1.7–7.7)
NEUTROPHILS NFR BLD: 60.8 %
PLATELET # BLD AUTO: 264 K/UL (ref 135–450)
PMV BLD AUTO: 8.4 FL (ref 5–10.5)
POTASSIUM SERPL-SCNC: 4.4 MMOL/L (ref 3.5–5.1)
PROT SERPL-MCNC: 7 G/DL (ref 6.4–8.2)
PROTHROMBIN TIME: 14.9 SEC (ref 11.9–14.9)
RBC # BLD AUTO: 4.32 M/UL (ref 4–5.2)
SODIUM SERPL-SCNC: 140 MMOL/L (ref 136–145)
WBC # BLD AUTO: 5 K/UL (ref 4–11)

## 2024-09-12 PROCEDURE — 85730 THROMBOPLASTIN TIME PARTIAL: CPT

## 2024-09-12 PROCEDURE — 93005 ELECTROCARDIOGRAM TRACING: CPT

## 2024-09-12 PROCEDURE — 85025 COMPLETE CBC W/AUTO DIFF WBC: CPT

## 2024-09-12 PROCEDURE — 85610 PROTHROMBIN TIME: CPT

## 2024-09-12 PROCEDURE — 87081 CULTURE SCREEN ONLY: CPT

## 2024-09-12 PROCEDURE — 80053 COMPREHEN METABOLIC PANEL: CPT

## 2024-09-12 PROCEDURE — 93010 ELECTROCARDIOGRAM REPORT: CPT | Performed by: INTERNAL MEDICINE

## 2024-09-12 PROCEDURE — 82306 VITAMIN D 25 HYDROXY: CPT

## 2024-09-12 PROCEDURE — 36415 COLL VENOUS BLD VENIPUNCTURE: CPT

## 2024-09-12 PROCEDURE — 83036 HEMOGLOBIN GLYCOSYLATED A1C: CPT

## 2024-09-12 RX ORDER — ACETAMINOPHEN 160 MG
TABLET,DISINTEGRATING ORAL
COMMUNITY

## 2024-09-12 RX ORDER — LUTEIN 6 MG
TABLET ORAL
COMMUNITY

## 2024-09-12 NOTE — PROGRESS NOTES
DATE 10/9___      PLACE __x_ [address]                          TIME ___                                             ___ [address]                           Arrival ___     Surgeons office to give time _x__      Surgery dates,times and arrival times are subject to change.Please check with your surgeon.  Bring a photo I.D.,insurance card and form of payment if you have a co pay.  Plan for someone 18 years or older to stay on site while you are her and to take you home after surgery.You are not permitted to drive yourself home. You cannot leave via Uber, Lyft, Taxi or Medical Transport by yourself. You must have someone with you. It is strongly suggested that someone stay with you the first 24 hours after anesthesia. Your surgery will be cancelled if you do not have a ride home. A parent or legal guardian guardian must stay with a child until the child is discharged. Please do not bring other children.  A History/Physical is required to be completed and dated within 30 days of your surgery. To be done by PCP _x_ Surgeon ___or Hospitalist ___  You may be required to get labs _x_ EKG _x_ or a chest Xray ___ prior to surgery. To be done by ____  Nothing to eat or drink after midnight the evening prior to surgery.  If your surgeon requires a bowel prep, follow their instructions.  You may brush your teeth.  Bring a complete list of your medications including vitamins and supplement with the name,dose and frequency.  Take the following medications with a sip of water the AM of surgery __labetolol__________________________________   [facility] patients do not take any medications the AM of surgery.  If you can not be reached by phone to give specific medication instructions,you may use your inhalers,take your heart, blood pressure,seizure and thyroid medications with a sip of water the AM of surgery. Bring your rescue inhaler with you if you use one.  DO NOT take blood pressure medications ending in \"pril\"or

## 2024-09-14 LAB — MRSA SPEC QL CULT: NORMAL

## 2024-09-26 NOTE — H&P (VIEW-ONLY)
Right lower leg: No edema.      Left lower leg: No edema.   Lymphadenopathy:      Cervical: No cervical adenopathy.   Neurological:      General: No focal deficit present.      Mental Status: She is alert and oriented to person, place, and time.   Psychiatric:         Mood and Affect: Mood normal.         Behavior: Behavior normal.         Thought Content: Thought content normal.         Assessment / Plan:     1. Pre-op exam-the patient is low risk for an intermediate risk procedure.  She has good functional status and no signs or symptoms to suggest coronary ischemia or congestive heart failure.  She may proceed with the planned surgical intervention without delay.    2. Chronic left hip pain    3. Primary hypertension-pressure is very well controlled on labetalol.  Continue this throughout the perioperative period.

## 2024-10-04 ENCOUNTER — OFFICE VISIT (OUTPATIENT)
Dept: ORTHOPEDIC SURGERY | Age: 64
End: 2024-10-04
Payer: MEDICARE

## 2024-10-04 DIAGNOSIS — M16.0 BILATERAL PRIMARY OSTEOARTHRITIS OF HIP: Primary | ICD-10-CM

## 2024-10-04 PROCEDURE — 99214 OFFICE O/P EST MOD 30 MIN: CPT | Performed by: ORTHOPAEDIC SURGERY

## 2024-10-04 NOTE — PROGRESS NOTES
radiographic evidence of AVN, fracture, or dislocation.     Labs: I personally reviewed her recently ordered laboratory work including CBC, CMP, PT/INR, A1C, Vitamin D.     Elective LEFT DARREN    Assessment and Plan?: The patient has functionally disabling hip pain with severe, bone on bone degenerative changes of the LEFT hip     We discussed the diagnosis and treatment options in detail with the patient.  Patient has tried conservative treatment including NSAID's, physical therapy, use of cane, injections. Given the failure of these conservative measures the patient is a good candidate for an elective total hip arthroplasty    Upon review of the patient's preoperative labs, PCP and specialist notes, the patient appears to be a good candidate for arthroplasty. It is safe to proceed with surgery.    We discussed the surgical procedure, recovery period, and protocol for pain management, expected post-operative course in detail.    We discussed the potential benefits of the surgery including improved mobility, pain relief as well as the inherent risks including but are not limited to infection, persistent pain, dislocation, leg length inequality, hardware failure, nerve or vascular injury, need for further surgery, deep vein thrombus, pulmonary embolism.   The patient has verbalized understanding of these risks and wishes to proceed.       ?___________________________   Abner La MD    cc: Efraín Matt MD

## 2024-10-08 ENCOUNTER — TELEPHONE (OUTPATIENT)
Dept: ORTHOPEDIC SURGERY | Age: 64
End: 2024-10-08

## 2024-10-09 ENCOUNTER — APPOINTMENT (OUTPATIENT)
Dept: GENERAL RADIOLOGY | Age: 64
End: 2024-10-09
Attending: ORTHOPAEDIC SURGERY
Payer: MEDICARE

## 2024-10-09 ENCOUNTER — ANESTHESIA EVENT (OUTPATIENT)
Dept: OPERATING ROOM | Age: 64
End: 2024-10-09
Payer: MEDICARE

## 2024-10-09 ENCOUNTER — HOSPITAL ENCOUNTER (OUTPATIENT)
Age: 64
Setting detail: OUTPATIENT SURGERY
Discharge: HOME | End: 2024-10-09
Attending: ORTHOPAEDIC SURGERY | Admitting: ORTHOPAEDIC SURGERY
Payer: MEDICARE

## 2024-10-09 ENCOUNTER — ANESTHESIA (OUTPATIENT)
Dept: OPERATING ROOM | Age: 64
End: 2024-10-09
Payer: MEDICARE

## 2024-10-09 VITALS
TEMPERATURE: 97 F | HEIGHT: 64 IN | WEIGHT: 126.7 LBS | BODY MASS INDEX: 21.63 KG/M2 | SYSTOLIC BLOOD PRESSURE: 106 MMHG | HEART RATE: 68 BPM | OXYGEN SATURATION: 100 % | DIASTOLIC BLOOD PRESSURE: 76 MMHG | RESPIRATION RATE: 15 BRPM

## 2024-10-09 DIAGNOSIS — M16.12 PRIMARY OSTEOARTHRITIS OF LEFT HIP: Primary | ICD-10-CM

## 2024-10-09 LAB
ABO + RH BLD: NORMAL
BLD GP AB SCN SERPL QL: NORMAL
GLUCOSE BLD-MCNC: 89 MG/DL (ref 70–99)
PERFORMED ON: NORMAL

## 2024-10-09 PROCEDURE — 6360000002 HC RX W HCPCS: Performed by: ORTHOPAEDIC SURGERY

## 2024-10-09 PROCEDURE — 2720000010 HC SURG SUPPLY STERILE: Performed by: ORTHOPAEDIC SURGERY

## 2024-10-09 PROCEDURE — 6360000002 HC RX W HCPCS: Performed by: PHYSICIAN ASSISTANT

## 2024-10-09 PROCEDURE — 2709999900 HC NON-CHARGEABLE SUPPLY: Performed by: ORTHOPAEDIC SURGERY

## 2024-10-09 PROCEDURE — 7100000010 HC PHASE II RECOVERY - FIRST 15 MIN: Performed by: ORTHOPAEDIC SURGERY

## 2024-10-09 PROCEDURE — 2580000003 HC RX 258: Performed by: ORTHOPAEDIC SURGERY

## 2024-10-09 PROCEDURE — 6360000002 HC RX W HCPCS

## 2024-10-09 PROCEDURE — 97165 OT EVAL LOW COMPLEX 30 MIN: CPT

## 2024-10-09 PROCEDURE — 7100000000 HC PACU RECOVERY - FIRST 15 MIN: Performed by: ORTHOPAEDIC SURGERY

## 2024-10-09 PROCEDURE — 2500000003 HC RX 250 WO HCPCS

## 2024-10-09 PROCEDURE — 97116 GAIT TRAINING THERAPY: CPT

## 2024-10-09 PROCEDURE — 3600000004 HC SURGERY LEVEL 4 BASE: Performed by: ORTHOPAEDIC SURGERY

## 2024-10-09 PROCEDURE — C1776 JOINT DEVICE (IMPLANTABLE): HCPCS | Performed by: ORTHOPAEDIC SURGERY

## 2024-10-09 PROCEDURE — 3700000001 HC ADD 15 MINUTES (ANESTHESIA): Performed by: ORTHOPAEDIC SURGERY

## 2024-10-09 PROCEDURE — 36415 COLL VENOUS BLD VENIPUNCTURE: CPT

## 2024-10-09 PROCEDURE — 86900 BLOOD TYPING SEROLOGIC ABO: CPT

## 2024-10-09 PROCEDURE — 6360000002 HC RX W HCPCS: Performed by: ANESTHESIOLOGY

## 2024-10-09 PROCEDURE — 97161 PT EVAL LOW COMPLEX 20 MIN: CPT

## 2024-10-09 PROCEDURE — 6370000000 HC RX 637 (ALT 250 FOR IP): Performed by: ANESTHESIOLOGY

## 2024-10-09 PROCEDURE — 3600000014 HC SURGERY LEVEL 4 ADDTL 15MIN: Performed by: ORTHOPAEDIC SURGERY

## 2024-10-09 PROCEDURE — A4217 STERILE WATER/SALINE, 500 ML: HCPCS | Performed by: ORTHOPAEDIC SURGERY

## 2024-10-09 PROCEDURE — 3700000000 HC ANESTHESIA ATTENDED CARE: Performed by: ORTHOPAEDIC SURGERY

## 2024-10-09 PROCEDURE — 2500000003 HC RX 250 WO HCPCS: Performed by: ORTHOPAEDIC SURGERY

## 2024-10-09 PROCEDURE — 6370000000 HC RX 637 (ALT 250 FOR IP): Performed by: ORTHOPAEDIC SURGERY

## 2024-10-09 PROCEDURE — 86901 BLOOD TYPING SEROLOGIC RH(D): CPT

## 2024-10-09 PROCEDURE — 97535 SELF CARE MNGMENT TRAINING: CPT

## 2024-10-09 PROCEDURE — 73501 X-RAY EXAM HIP UNI 1 VIEW: CPT

## 2024-10-09 PROCEDURE — 72170 X-RAY EXAM OF PELVIS: CPT

## 2024-10-09 PROCEDURE — 7100000011 HC PHASE II RECOVERY - ADDTL 15 MIN: Performed by: ORTHOPAEDIC SURGERY

## 2024-10-09 PROCEDURE — 86850 RBC ANTIBODY SCREEN: CPT

## 2024-10-09 PROCEDURE — 97530 THERAPEUTIC ACTIVITIES: CPT

## 2024-10-09 PROCEDURE — 7100000001 HC PACU RECOVERY - ADDTL 15 MIN: Performed by: ORTHOPAEDIC SURGERY

## 2024-10-09 DEVICE — IMPLANTABLE DEVICE: Type: IMPLANTABLE DEVICE | Site: HIP | Status: FUNCTIONAL

## 2024-10-09 DEVICE — BIOLOX® DELTA, CERAMIC FEMORAL HEAD, S, Ø 36/-3.5, TAPER 12/14
Type: IMPLANTABLE DEVICE | Site: HIP | Status: FUNCTIONAL
Brand: BIOLOX® DELTA

## 2024-10-09 DEVICE — LINER ACET 36 MM HIP NEUT POLYETH G7 VIVACIT E: Type: IMPLANTABLE DEVICE | Site: HIP | Status: FUNCTIONAL

## 2024-10-09 DEVICE — SHELL ACET SZ D DIA50MM 3 H OSSEOTI LIMIT H 2 MOBILITY G7: Type: IMPLANTABLE DEVICE | Site: HIP | Status: FUNCTIONAL

## 2024-10-09 RX ORDER — OXYCODONE HYDROCHLORIDE 5 MG/1
5 TABLET ORAL EVERY 4 HOURS PRN
Qty: 42 TABLET | Refills: 0 | Status: SHIPPED | OUTPATIENT
Start: 2024-10-09 | End: 2024-10-16

## 2024-10-09 RX ORDER — HYDRALAZINE HYDROCHLORIDE 20 MG/ML
10 INJECTION INTRAMUSCULAR; INTRAVENOUS
Status: DISCONTINUED | OUTPATIENT
Start: 2024-10-09 | End: 2024-10-09 | Stop reason: HOSPADM

## 2024-10-09 RX ORDER — TRANEXAMIC ACID 10 MG/ML
1000 INJECTION, SOLUTION INTRAVENOUS
Status: COMPLETED | OUTPATIENT
Start: 2024-10-09 | End: 2024-10-09

## 2024-10-09 RX ORDER — DEXAMETHASONE SODIUM PHOSPHATE 4 MG/ML
INJECTION, SOLUTION INTRA-ARTICULAR; INTRALESIONAL; INTRAMUSCULAR; INTRAVENOUS; SOFT TISSUE
Status: DISCONTINUED | OUTPATIENT
Start: 2024-10-09 | End: 2024-10-09 | Stop reason: SDUPTHER

## 2024-10-09 RX ORDER — ACETAMINOPHEN 500 MG
1000 TABLET ORAL 3 TIMES DAILY
Qty: 84 TABLET | Refills: 0 | Status: SHIPPED | OUTPATIENT
Start: 2024-10-09

## 2024-10-09 RX ORDER — SODIUM CHLORIDE 0.9 % (FLUSH) 0.9 %
5-40 SYRINGE (ML) INJECTION PRN
Status: DISCONTINUED | OUTPATIENT
Start: 2024-10-09 | End: 2024-10-09 | Stop reason: HOSPADM

## 2024-10-09 RX ORDER — DEXAMETHASONE SODIUM PHOSPHATE 10 MG/ML
INJECTION, SOLUTION INTRAMUSCULAR; INTRAVENOUS
Status: COMPLETED
Start: 2024-10-09 | End: 2024-10-09

## 2024-10-09 RX ORDER — SODIUM CHLORIDE 0.9 % (FLUSH) 0.9 %
5-40 SYRINGE (ML) INJECTION EVERY 12 HOURS SCHEDULED
Status: DISCONTINUED | OUTPATIENT
Start: 2024-10-09 | End: 2024-10-09 | Stop reason: HOSPADM

## 2024-10-09 RX ORDER — MAGNESIUM HYDROXIDE 1200 MG/15ML
LIQUID ORAL CONTINUOUS PRN
Status: COMPLETED | OUTPATIENT
Start: 2024-10-09 | End: 2024-10-09

## 2024-10-09 RX ORDER — ACETAMINOPHEN 500 MG
1000 TABLET ORAL ONCE
Status: COMPLETED | OUTPATIENT
Start: 2024-10-09 | End: 2024-10-09

## 2024-10-09 RX ORDER — OXYCODONE HYDROCHLORIDE 5 MG/1
5 TABLET ORAL
Status: COMPLETED | OUTPATIENT
Start: 2024-10-09 | End: 2024-10-09

## 2024-10-09 RX ORDER — ONDANSETRON 2 MG/ML
INJECTION INTRAMUSCULAR; INTRAVENOUS
Status: DISCONTINUED | OUTPATIENT
Start: 2024-10-09 | End: 2024-10-09 | Stop reason: SDUPTHER

## 2024-10-09 RX ORDER — CELECOXIB 200 MG/1
400 CAPSULE ORAL ONCE
Status: COMPLETED | OUTPATIENT
Start: 2024-10-09 | End: 2024-10-09

## 2024-10-09 RX ORDER — ONDANSETRON 2 MG/ML
4 INJECTION INTRAMUSCULAR; INTRAVENOUS
Status: COMPLETED | OUTPATIENT
Start: 2024-10-09 | End: 2024-10-09

## 2024-10-09 RX ORDER — PROPOFOL 10 MG/ML
INJECTION, EMULSION INTRAVENOUS
Status: DISCONTINUED | OUTPATIENT
Start: 2024-10-09 | End: 2024-10-09 | Stop reason: SDUPTHER

## 2024-10-09 RX ORDER — LABETALOL HYDROCHLORIDE 5 MG/ML
10 INJECTION, SOLUTION INTRAVENOUS
Status: DISCONTINUED | OUTPATIENT
Start: 2024-10-09 | End: 2024-10-09 | Stop reason: HOSPADM

## 2024-10-09 RX ORDER — KETOROLAC TROMETHAMINE 30 MG/ML
15 INJECTION, SOLUTION INTRAMUSCULAR; INTRAVENOUS ONCE
Status: COMPLETED | OUTPATIENT
Start: 2024-10-09 | End: 2024-10-09

## 2024-10-09 RX ORDER — MIDAZOLAM HYDROCHLORIDE 1 MG/ML
INJECTION INTRAMUSCULAR; INTRAVENOUS
Status: DISCONTINUED | OUTPATIENT
Start: 2024-10-09 | End: 2024-10-09 | Stop reason: SDUPTHER

## 2024-10-09 RX ORDER — VANCOMYCIN HYDROCHLORIDE 1 G/20ML
INJECTION, POWDER, LYOPHILIZED, FOR SOLUTION INTRAVENOUS
Status: COMPLETED | OUTPATIENT
Start: 2024-10-09 | End: 2024-10-09

## 2024-10-09 RX ORDER — DEXAMETHASONE SODIUM PHOSPHATE 10 MG/ML
10 INJECTION, SOLUTION INTRAMUSCULAR; INTRAVENOUS ONCE
Status: COMPLETED | OUTPATIENT
Start: 2024-10-09 | End: 2024-10-09

## 2024-10-09 RX ORDER — LIDOCAINE HYDROCHLORIDE 20 MG/ML
INJECTION, SOLUTION INFILTRATION; PERINEURAL
Status: DISCONTINUED | OUTPATIENT
Start: 2024-10-09 | End: 2024-10-09 | Stop reason: SDUPTHER

## 2024-10-09 RX ORDER — SODIUM CHLORIDE 9 MG/ML
INJECTION, SOLUTION INTRAVENOUS PRN
Status: DISCONTINUED | OUTPATIENT
Start: 2024-10-09 | End: 2024-10-09 | Stop reason: HOSPADM

## 2024-10-09 RX ORDER — SENNA AND DOCUSATE SODIUM 50; 8.6 MG/1; MG/1
1 TABLET, FILM COATED ORAL DAILY
Qty: 14 TABLET | Refills: 0 | Status: SHIPPED | OUTPATIENT
Start: 2024-10-09

## 2024-10-09 RX ORDER — ASPIRIN 81 MG/1
81 TABLET ORAL 2 TIMES DAILY
Qty: 60 TABLET | Refills: 0 | Status: SHIPPED | OUTPATIENT
Start: 2024-10-09 | End: 2024-11-08

## 2024-10-09 RX ORDER — NALOXONE HYDROCHLORIDE 0.4 MG/ML
INJECTION, SOLUTION INTRAMUSCULAR; INTRAVENOUS; SUBCUTANEOUS PRN
Status: DISCONTINUED | OUTPATIENT
Start: 2024-10-09 | End: 2024-10-09 | Stop reason: HOSPADM

## 2024-10-09 RX ORDER — MELOXICAM 15 MG/1
15 TABLET ORAL DAILY
Qty: 14 TABLET | Refills: 0 | Status: SHIPPED | OUTPATIENT
Start: 2024-10-09

## 2024-10-09 RX ORDER — MEPERIDINE HYDROCHLORIDE 25 MG/ML
12.5 INJECTION INTRAMUSCULAR; INTRAVENOUS; SUBCUTANEOUS EVERY 5 MIN PRN
Status: DISCONTINUED | OUTPATIENT
Start: 2024-10-09 | End: 2024-10-09 | Stop reason: HOSPADM

## 2024-10-09 RX ORDER — HYDROMORPHONE HYDROCHLORIDE 2 MG/ML
0.5 INJECTION, SOLUTION INTRAMUSCULAR; INTRAVENOUS; SUBCUTANEOUS EVERY 5 MIN PRN
Status: DISCONTINUED | OUTPATIENT
Start: 2024-10-09 | End: 2024-10-09 | Stop reason: HOSPADM

## 2024-10-09 RX ADMIN — ONDANSETRON 4 MG: 2 INJECTION INTRAMUSCULAR; INTRAVENOUS at 07:11

## 2024-10-09 RX ADMIN — PROPOFOL 125 MCG/KG/MIN: 10 INJECTION, EMULSION INTRAVENOUS at 07:07

## 2024-10-09 RX ADMIN — OXYCODONE HYDROCHLORIDE 5 MG: 5 TABLET ORAL at 10:49

## 2024-10-09 RX ADMIN — DEXAMETHASONE SODIUM PHOSPHATE 10 MG: 10 INJECTION, SOLUTION INTRAMUSCULAR; INTRAVENOUS at 13:00

## 2024-10-09 RX ADMIN — KETOROLAC TROMETHAMINE 15 MG: 30 INJECTION, SOLUTION INTRAMUSCULAR at 09:12

## 2024-10-09 RX ADMIN — DEXAMETHASONE SODIUM PHOSPHATE 10 MG: 10 INJECTION INTRAMUSCULAR; INTRAVENOUS at 13:00

## 2024-10-09 RX ADMIN — SODIUM CHLORIDE: 9 INJECTION, SOLUTION INTRAVENOUS at 06:30

## 2024-10-09 RX ADMIN — DEXAMETHASONE SODIUM PHOSPHATE 8 MG: 4 INJECTION, SOLUTION INTRAMUSCULAR; INTRAVENOUS at 07:11

## 2024-10-09 RX ADMIN — ACETAMINOPHEN 1000 MG: 500 TABLET ORAL at 06:28

## 2024-10-09 RX ADMIN — MEPIVACAINE HYDROCHLORIDE 45 MG: 20 INJECTION, SOLUTION EPIDURAL; INFILTRATION at 07:05

## 2024-10-09 RX ADMIN — CEFAZOLIN 2000 MG: 2 INJECTION, POWDER, FOR SOLUTION INTRAMUSCULAR; INTRAVENOUS at 07:04

## 2024-10-09 RX ADMIN — LIDOCAINE HYDROCHLORIDE 100 MG: 20 INJECTION, SOLUTION INFILTRATION; PERINEURAL at 07:07

## 2024-10-09 RX ADMIN — ONDANSETRON 4 MG: 2 INJECTION INTRAMUSCULAR; INTRAVENOUS at 12:29

## 2024-10-09 RX ADMIN — CELECOXIB 400 MG: 200 CAPSULE ORAL at 06:27

## 2024-10-09 RX ADMIN — SODIUM CHLORIDE: 9 INJECTION, SOLUTION INTRAVENOUS at 07:59

## 2024-10-09 RX ADMIN — TRANEXAMIC ACID 1000 MG: 10 INJECTION, SOLUTION INTRAVENOUS at 07:09

## 2024-10-09 RX ADMIN — MIDAZOLAM 2 MG: 1 INJECTION INTRAMUSCULAR; INTRAVENOUS at 06:58

## 2024-10-09 ASSESSMENT — PAIN SCALES - GENERAL
PAINLEVEL_OUTOF10: 5
PAINLEVEL_OUTOF10: 8
PAINLEVEL_OUTOF10: 6
PAINLEVEL_OUTOF10: 2

## 2024-10-09 ASSESSMENT — LIFESTYLE VARIABLES: SMOKING_STATUS: 0

## 2024-10-09 ASSESSMENT — PAIN DESCRIPTION - DESCRIPTORS: DESCRIPTORS: DISCOMFORT

## 2024-10-09 ASSESSMENT — PAIN - FUNCTIONAL ASSESSMENT: PAIN_FUNCTIONAL_ASSESSMENT: 0-10

## 2024-10-09 ASSESSMENT — PAIN DESCRIPTION - LOCATION
LOCATION: HIP
LOCATION: BACK
LOCATION: HIP
LOCATION: HIP

## 2024-10-09 ASSESSMENT — PAIN DESCRIPTION - ORIENTATION
ORIENTATION: LEFT

## 2024-10-09 NOTE — DISCHARGE INSTR - COC
Continuity of Care Form    Patient Name: Isis Malone   :  1960  MRN:  [id number]    Admit date:  10/9/2024  Discharge date:  ***    Code Status Order: Full Code   Advance Directives:   Advance Care Flowsheet Documentation        Date/Time Healthcare Directive Type of Healthcare Directive Copy in Chart Healthcare Agent Appointed Healthcare Agent's Name Healthcare Agent's Phone Number    10/09/24 0614 No, patient does not have an advance directive for healthcare treatment  --  --  --  --  --                     Admitting Physician:  Abner La MD  PCP: Efraín Matt MD    Discharging Nurse: ***  Discharging Hospital Unit/Room#: OR/NONE  Discharging Unit Phone Number: ***    Emergency Contact:   Extended Emergency Contact Information  Primary Emergency Contact: Harley Malone  Address: [address] [place] [address]  Home Phone: [phone]  Mobile Phone: [phone]  Relation: Spouse  Secondary Emergency Contact: MiyesseniaSanjuanita  Home Phone: [phone]  Relation: Child    Past Surgical History:  Past Surgical History:   Procedure Laterality Date    CLEFT PALATE REPAIR  1964 and 1971    COLONOSCOPY  2014    DILATION AND CURETTAGE OF UTERUS  2011    HYSTEROSCOPY,NOVASURE ABLATION    INNER EAR SURGERY Right 10/2022    Right ear mass    MANDIBLE SURGERY  1990    OVARY REMOVAL      and cyst    TOTAL KNEE ARTHROPLASTY  2011    Left    TOTAL KNEE ARTHROPLASTY Right 2023    RIGHT TOTAL KNEE REPLACEMENT - GIOVANY CEMENTED performed by Abner La MD at [facility] OR       Immunization History:   Immunization History   Administered Date(s) Administered    INFLUENZA, INTRADERMAL, QUADRIVALENT, PRESERVATIVE FREE 2016    Influenza Virus Vaccine 10/06/2017, 2018, 10/04/2019, 10/02/2020, 2021    Influenza Whole 10/01/2011    RSV, AREXVY, (age 60y+), PF, IM, 0.5mL 2024    TDaP, ADACEL (age 10y-64y), BOOSTRIX (age

## 2024-10-09 NOTE — PROGRESS NOTES
Pt received from PACU, pt awake alert and oriented, vss, resp even and easy, dressing to left hip is cdi, left pedal pulse palpable. Pt has complaints of pain 8/10 medicated per MAR. Pt able to wiggle both feet and slightly lift right leg. Bedside report received from Gwendolyn SOMMERS.

## 2024-10-09 NOTE — PROGRESS NOTES
Pt arrived from OR to PACU, awakens to voice, denies pain. VSS, O2 sats 98% on room air. Dressing to left hip dry and intact, ice pack in place. No motor movement in BLE at this time s/p spinal, sensation at knee level. Bilateral pedal and posterior tibial pulses palpable, feet warm. Will monitor.

## 2024-10-09 NOTE — PROGRESS NOTES
Pt resting quietly in bed, awake, tolerating PO intake, denies pain. VSS, O2 sats 97% on room air. Dressing to left hip dry and intact, ice pack remains in place. Pt with equal motor movement in BLE, weak planter flexion/dorsiflexion and decreased sensation in bilateral feet s/p spinal. Pt seen by anesthesia, phase 1 criteria met.

## 2024-10-09 NOTE — PROGRESS NOTES
Occupational Therapy    [facility] - Inpatient Rehabilitation Department   Phone: [phone]    Occupational Therapy    [x] Initial Evaluation            [] Daily Treatment Note         [x] Discharge Summary      Patient: Isis Malone   : 1960   MRN: [id number]   Date of Service:  10/9/2024    Admitting Diagnosis:  Primary osteoarthritis of left hip  Current Admission Summary: Per H&P, \" Patient is here for preop examination for left total hip arthroplasty.  The patient has been having intermittent hip pain for a number of years, and now she needs surgical intervention.  She has failed conservative measures at this point.     Overall the patient feels well.  She has no exertional symptoms with activity and she can climb stairs without difficulty, only limited by hip pain.  She has longstanding hypertension which is well-managed with labetalol twice daily.  Denies chest pain or shortness of breath.  Denies PND or orthopnea.  No lower extremity edema noted. \"  Past Medical History:  has a past medical history of Arthritis, Hypertension, Prolonged emergence from general anesthesia, and Stickler syndrome.  Past Surgical History:  has a past surgical history that includes Total knee arthroplasty (2011); Ovary removal (); Dilation and curettage of uterus (2011); Cleft palate repair ( and ); Mandible surgery (1990); Inner ear surgery (Right, 10/2022); Total knee arthroplasty (Right, 2023); and Colonoscopy (2014).    Discharge Recommendations: Isis Malone scored a 20/24 on the AM-PAC ADL Inpatient form. Current research shows that an AM-PAC score of 18 or greater is typically associated with a discharge to the patient's home setting. Based on the patient's AM-PAC score, and their current ADL deficits, it is recommended that the patient have 2-3 sessions per week of Occupational Therapy at d/c to increase the patient's independence.  At this time, this

## 2024-10-09 NOTE — ANESTHESIA PRE PROCEDURE
Department of Anesthesiology  Preprocedure Note       Name:  Isis Malone   Age:  63 y.o.  :  1960                                          MRN:  [id number]         Date:  10/9/2024      Surgeon: Surgeon(s):  Abner La MD    Procedure: Procedure(s):  LEFT TOTAL HIP ARTHROPLASTY ANTERIOR APPROACH - GIOVANY    Medications prior to admission:   Prior to Admission medications    Medication Sig Start Date End Date Taking? Authorizing Provider   Turmeric 450 MG CAPS Take by mouth   Yes Tommy Mcdaniel MD   Cholecalciferol (VITAMIN D3) 50 MCG ( UT) CAPS Take by mouth   Yes Tommy Mcdaniel MD   Cinnamon 500 MG TABS Take 1,500 mg by mouth   Yes Tommy Mcdaniel MD   Lutein 20 MG TABS Take by mouth   Yes Tommy Mcdaniel MD   labetalol (NORMODYNE) 200 MG tablet Take 1 tablet by mouth 2 times daily 3/18/24  Yes Efraín Matt MD   traZODone (DESYREL) 50 MG tablet Take 1-2 tablets by mouth nightly as needed for Sleep 24  Yes Efraín Matt MD   Multiple Vitamins-Minerals (CENTRUM SILVER 50+WOMEN) TABS Take by mouth daily   Yes Tommy Mcdaniel MD       Current medications:    Current Facility-Administered Medications   Medication Dose Route Frequency Provider Last Rate Last Admin    ortho mix injection   Injection On Call Abner La MD        tranexamic acid-NaCl IVPB premix 1,000 mg  1,000 mg IntraVENous On Call to OR Abner La MD        sodium chloride flush 0.9 % injection 5-40 mL  5-40 mL IntraVENous 2 times per day Abner La MD        sodium chloride flush 0.9 % injection 5-40 mL  5-40 mL IntraVENous PRN Abner La MD        0.9 % sodium chloride infusion   IntraVENous PRN Abner La  mL/hr at 10/09/24 0630 New Bag at 10/09/24 0630    ceFAZolin (ANCEF) 2,000 mg in sterile water 20 mL IV syringe  2,000 mg IntraVENous On Call to OR Abner La MD           Allergies:    Allergies   Allergen Reactions    Morphine Other (See

## 2024-10-09 NOTE — ANESTHESIA POSTPROCEDURE EVALUATION
Department of Anesthesiology  Postprocedure Note    Patient: Isis Malone  MRN: [id number]  YOB: 1960  Date of evaluation: 10/9/2024    Procedure Summary       Date: 10/09/24 Room / Location: [address]    Anesthesia Start: 0658 Anesthesia Stop: 0841    Procedure: LEFT TOTAL HIP ARTHROPLASTY ANTERIOR APPROACH - GIOVANY (Left: Hip) Diagnosis:       Primary osteoarthritis of left hip      (Primary osteoarthritis of left hip [M16.12])    Surgeons: Abner La MD Responsible Provider: Omar Torres MD    Anesthesia Type: MAC, spinal ASA Status: 3            Anesthesia Type: No value filed.    Aaron Phase I: Aaron Score: 10    Aaron Phase II:      Anesthesia Post Evaluation    Patient location during evaluation: PACU  Patient participation: complete - patient participated  Level of consciousness: awake and alert  Airway patency: patent  Nausea & Vomiting: no vomiting and no nausea  Cardiovascular status: hemodynamically stable  Respiratory status: acceptable  Hydration status: stable  Pain management: adequate    No notable events documented.

## 2024-10-09 NOTE — PROGRESS NOTES
[facility] - Inpatient Rehabilitation Department   Phone: [phone]    Physical Therapy    [x] Initial Evaluation            [] Daily Treatment Note         [x] Discharge Summary      Patient: Isis Malone   : 1960   MRN: [id number]   Date of Service:  10/9/2024  Admitting Diagnosis: Primary osteoarthritis of left hip  Current Admission Summary: 64 yo female seen s/p (L) DARREN by Dr. La via anterior approach, no precautions. WBAT. Pt with previous poor recovery from anesthesia. Hx: (B) TKA.  Past Medical History:  has a past medical history of Arthritis, Hypertension, Prolonged emergence from general anesthesia, and Stickler syndrome.  Past Surgical History:  has a past surgical history that includes Total knee arthroplasty (2011); Ovary removal (); Dilation and curettage of uterus (2011); Cleft palate repair ( and ); Mandible surgery (1990); Inner ear surgery (Right, 10/2022); Total knee arthroplasty (Right, 2023); Colonoscopy (2014); and Total hip arthroplasty (Left, 10/9/2024).    Discharge Recommendations: Isis Malone scored a 18/24 on the AM-PAC short mobility form. Current research shows that an AM-PAC score of 18 or greater is typically associated with a discharge to the patient's home setting. Based on the patient's AM-PAC score and their current functional mobility deficits, it is recommended that the patient have 2-3 sessions per week of Physical Therapy at d/c to increase the patient's independence.  At this time, this patient demonstrates the endurance and safety to discharge home with 24/7 assist and home PT and a follow up treatment frequency of 2-3x/wk.  Please see assessment section for further patient specific details.    HOME HEALTH CARE: LEVEL 1 STANDARD  - Initial home health evaluation to occur within 24-48 hours, in patient home   - Therapy to evaluate with goal of regaining prior level of functioning   - Therapy to

## 2024-10-09 NOTE — ANESTHESIA PROCEDURE NOTES
Spinal Block    Patient location during procedure: OR  End time: 10/9/2024 7:07 AM  Reason for block: primary anesthetic  Staffing  Performed: other anesthesia staff   Anesthesiologist: Omar Torres MD  Resident/CRNA: Fay Hargrove APRN - CRNA  Other anesthesia staff: Cortes Hubbard RN  Performed by: Cortes Hubbard RN  Authorized by: Omar Torers MD    Spinal Block  Patient position: sitting  Prep: ChloraPrep  Patient monitoring: continuous pulse ox, frequent blood pressure checks and oxygen  Approach: midline  Location: L3/L4  Provider prep: mask and sterile gloves  Needle  Needle type: Pencan   Needle gauge: 24 G  Needle length: 3.5 in  Kit: [id number]  Expiration date: 5/31/2026  Assessment  Sensory level: T6  Swirl obtained: Yes  CSF: clear  Attempts: 1  Hemodynamics: stable  Preanesthetic Checklist  Completed: patient identified, IV checked, site marked, risks and benefits discussed, surgical/procedural consents, equipment checked, pre-op evaluation, timeout performed, anesthesia consent given, oxygen available, monitors applied/VS acknowledged, fire risk safety assessment completed and verbalized and blood product R/B/A discussed and consented

## 2024-10-09 NOTE — INTERVAL H&P NOTE
Update History & Physical    The patient's History and Physical of September 26, 2024 was reviewed with the patient and I examined the patient. There was no change. The surgical site was confirmed by the patient and me.     Plan: The risks, benefits, expected outcome, and alternative to the recommended procedure have been discussed with the patient. Patient understands and wants to proceed with the procedure.     Electronically signed by Abner La MD on 10/9/2024 at 6:46 AM

## 2024-10-09 NOTE — OP NOTE
layer was closed with a #1 Vicryl in a running fashion.  The subcutaneous adipose tissue was reapproximated with 0 Vicryl in a running fashion. The subcuticular layer was closed with a 2-0 Vicryl in a running fashion.  The skin was closed with monocryl with dermabond. A sterile dressing was applied. The drapes were taken down and the patient was transferred to the hospital bed. The patient was transported to the Recovery Room in comfortable and stable condition.  All counts were correct at the completion of the case.     ATTESTATION: Dr. La was present and scrubbed for the entire procedure. PA, served as first assistant, as there was no qualified resident available.

## 2024-10-09 NOTE — PROGRESS NOTES
[facility] Orthopedic Surgery   Progress Note    CHIEF COMPLAINT/DIAGNOSIS: S/p left Total Hip Arthroplasty    SUBJECTIVE: The patient is seen sitting up in the bed in PACU; describes ongoing paresthesias from the spinal.  Has RW at home.  Denies other issues.     OBJECTIVE  Physical    VITALS:  BP (!) 144/90   Pulse 58   Temp 96.9 °F (36.1 °C) (Oral)   Resp 12   Ht 1.626 m (5' 4\")   Wt 57.5 kg (126 lb 11.2 oz)   LMP 12/01/2011   SpO2 98%   BMI 21.75 kg/m²     GENERAL: Alert and oriented x3, in no acute distress.   MUSCULOSKELETAL:   Able to dorsi and plantarflex the ankle without issue.   INCISION:  Covered with post-op dressing which is c/d/I.  Sensory:  Intact to light touch in peroneal and tibial distributions.   Vascular:   2+ DP pulses with brisk cap refill;  calf soft and nontender    Data    ALL MEDICATIONS HAVE BEEN REVIEWED    CBC: No results for input(s): \"WBC\", \"HGB\", \"HCT\", \"PLT\" in the last 72 hours.  BMP: No results for input(s): \"NA\", \"K\", \"CL\", \"CO2\", \"PHOS\", \"BUN\", \"CREATININE\" in the last 72 hours.    Invalid input(s): \"CA\"  INR: No results for input(s): \"INR\" in the last 72 hours.    Post-op films show stable total hip arthroplasty construct without acute complication.     ASSESSMENT:  S/p left Total Hip Arthroplasty (10/9/24), POD#0  LEONID  HTN    PLAN:   - WB status:  WBAT ; No specific hip precautions.  - DVT prophylaxis: ASA 81mg BID x 30 days   - PT/OT  - Pain Control: tylenol, mobic and oxy prn. Due to orthopaedic surgical procedure/condition, patient may require pain medication for up to 6-8 weeks.  - Dispo: home with home PT today    Follow-up with Dr. La as scheduled on 10/24   Office # [phone]  Future Appointments   Date Time Provider Department Center   10/24/2024 11:15 AM Abner La MD [facility]   4/7/2025 11:00 AM Efraín Matt MD [facility] DEP       Filiberto Fox, WALLY - CNP  10/9/2024  9:04 AM

## 2024-10-09 NOTE — ADDENDUM NOTE
Addendum  created 10/09/24 0912 by Fay Hargrove APRN - CRNA    Attestation recorded in Intraprocedure, Flowsheet accepted, Intraprocedure Attestations filed

## 2024-10-09 NOTE — DISCHARGE INSTRUCTIONS
Dr. Abhinav La Total HIP Replacement Instructions      Follow-up with Dr. La in 2 weeks; [phone] ([facility])    Incision care  Should your incision start to drain let our office know.  Continue yajaira hose (knee high) on the operative leg for two weeks.  Can be removed at night and reapplied in the morning.     Okay to shower tomorrow, as long as the incision remains covered with waterproof dressing (your post-op incision is).  Do not tub bathe or submerge your incision in water.  Should your incision start to drain, let our office know.  You may remove your Therabond dressing on post-op day 7.  Prior to any wound care please wash and dry your hands.  During the day, continue to wear your YAJAIRA stocking on the operative leg. Take the stocking off at night. You do not need to wear a stocking on your non-operative leg. You should wear the compression stocking until your post-op visit, this keeps lower extremity swelling to a minimum and reduces joint stiffness.    Exercise  Physical therapy will be started right away and should be set up prior to your surgery.  PT will be 2-3 times a week for 4-6 weeks.  Some patients will start this at home for two weeks then transition to outpatient PT, while others will go straight to outpatient PT the day after surgery.  Unless told otherwise, your operative leg is \"weight bearing as tolerated\".  This means you can put all of your weight on your surgery leg.  You may progress off support as tolerated.   Elevate your operative leg to help with swelling.  Dr. La wants his total joint patients to walk for 5-10 minutes every hour while awake.  Ask your surgeon's office for  FMLA paperwork or a return to work note.    Ice  For pain and swelling, put ice or a cold pack on the area for 10 to 20 minutes at a time. Put a thin cloth between the ice and your skin such as a clean pillow case or thin towel.    Diet  Resume your home diet  Prevent

## 2024-10-11 ENCOUNTER — TELEPHONE (OUTPATIENT)
Dept: ORTHOPEDIC SURGERY | Age: 64
End: 2024-10-11

## 2024-10-11 NOTE — TELEPHONE ENCOUNTER
Spoke with patient and reviewed picture with Dr. La. Mild erythema inferior to incision. We discussed that this is within the realm of normal at this point in the post-op period. She denies any incisional drainage at this time. I instructed her to monitor for now. She will contact us if this worsens.     Edgar You PA-C

## 2024-10-11 NOTE — TELEPHONE ENCOUNTER
General Question     Subject: LT HIP  Patient and /or Facility Request: Isis Malone   Contact Number:  [phone]     PATIENT CALLING REGARDING SHE HAD TOTAL LT HIP REPLACEMENT ON 10/9/24.    PATIENT HAS REDNESS IN ONE SIDE OF THE INCISION AND HAVING A PINS AND NEEDLE SENSATION AS WELL.    HOLGER RENE TOOK PHONE CALL

## 2024-10-11 NOTE — TELEPHONE ENCOUNTER
Call Transferred    S/P Lt Thr 10/9.    Per patient symptoms started yesterday. States she has a rash, sunburn type redness at the bottom of her incision area. More concerned regarding the feeling of getting \"stuck\" with pins and needles. Feels like she is doing worse today, than when she left the hospital. Feels like a sharp stabbing. Req to speak with someone.        Reaching out to clinic    Aware call will be returned

## 2024-10-14 ENCOUNTER — TELEPHONE (OUTPATIENT)
Dept: ORTHOPEDIC SURGERY | Age: 64
End: 2024-10-14

## 2024-10-14 NOTE — TELEPHONE ENCOUNTER
Spoke with patient and confirmed that she did receive spinal anesthesia. She may be having symptoms from sedation. She also noted that she has had constipation. I recommended she try Miralax, continue drinking lots of fluids. All pt questions answered.     Edgar You PA-C    
Normal rate, regular rhythm.  Heart sounds S1, S2.  No murmurs, rubs or gallops.

## 2024-10-18 DIAGNOSIS — Z96.652 S/P TOTAL KNEE ARTHROPLASTY, LEFT: Primary | ICD-10-CM

## 2024-10-18 DIAGNOSIS — M16.12 PRIMARY OSTEOARTHRITIS OF LEFT HIP: ICD-10-CM

## 2024-10-18 RX ORDER — OXYCODONE HYDROCHLORIDE 5 MG/1
5 TABLET ORAL EVERY 4 HOURS PRN
Qty: 42 TABLET | Refills: 0 | Status: SHIPPED | OUTPATIENT
Start: 2024-10-18 | End: 2024-10-25

## 2024-10-18 NOTE — TELEPHONE ENCOUNTER
LEFT TOTAL HIP ARTHROPLASTY ANTERIOR APPROACH - Abner Durand MD             Sx 10/9/24     Patient request OXYCODONE 5MG     PATIENT HAS ENOUGH TO GET THR THE WEEKEND BUT WILL BE OUT MONDAY          Last fill 10/9/2024

## 2024-10-18 NOTE — TELEPHONE ENCOUNTER
Prescription Refill     Medication Name:  OXYCODONE 5MG   Pharmacy: [facility] [address]   Patient Contact Number:  [phone]     PATIENT HAS ENOUGH TO GET THR THE WEEKEND BUT WILL BE OUT MONDAY

## 2024-10-28 ENCOUNTER — OFFICE VISIT (OUTPATIENT)
Dept: ORTHOPEDIC SURGERY | Age: 64
End: 2024-10-28

## 2024-10-28 VITALS — BODY MASS INDEX: 21.51 KG/M2 | HEIGHT: 64 IN | WEIGHT: 126 LBS

## 2024-10-28 DIAGNOSIS — M16.12 PRIMARY OSTEOARTHRITIS OF LEFT HIP: Primary | ICD-10-CM

## 2024-10-28 PROCEDURE — 99024 POSTOP FOLLOW-UP VISIT: CPT | Performed by: PHYSICIAN ASSISTANT

## 2024-10-29 NOTE — PROGRESS NOTES
Patient: Isis Malone                  : 1960   MRN: [id number]   Date of Visit: 10/29/24     Physician: Abner La MD.     Reason for Visit: Status post DARREN     Subjective History of Present Illness:     Isis Malone is here for regularly scheduled follow-up s/p LEFT DARREN. Reports they are doing well, occasional aches and pains are controlled with over the counter medications. Taking opioid medications sparingly and continuing to wean. They do not report fevers, chills or drainage from the incision site    Physical Examination??: ?   General: Patient is alert and oriented x 3 and appears comfortable.   Incision is well-approximated, no erythema, fluctuance   Fires quad, SILT to thigh     Radiographs: AP pelvis/AP/lateral hip views of operative hip with well-positioned total hip arthroplasty. No evidence of fracture subluxation or dislocation.        Assessment and Plan?: The patient is progressing well approximately 2 weeks s/p DARREN     1. A thorough discussion was had with the patient concerning the ?postoperative course and the patient is in agreement with the plan.   2. They will continue to wean from pain medications and progress weight bearing    3. Discussed the need for antibiotics prior to dental procedures at least for 1 years after arthroplasty  4. Will see the patient in 4 weeks with repeat xrays at that time.    _______________________      Will VIDHYA You

## 2024-10-31 ENCOUNTER — TELEPHONE (OUTPATIENT)
Dept: ORTHOPEDIC SURGERY | Age: 64
End: 2024-10-31

## 2024-10-31 DIAGNOSIS — M16.12 PRIMARY OSTEOARTHRITIS OF LEFT HIP: Primary | ICD-10-CM

## 2024-10-31 RX ORDER — OXYCODONE HYDROCHLORIDE 5 MG/1
5 TABLET ORAL EVERY 4 HOURS PRN
Qty: 42 TABLET | Refills: 0 | Status: SHIPPED | OUTPATIENT
Start: 2024-10-31 | End: 2024-11-07

## 2024-10-31 NOTE — TELEPHONE ENCOUNTER
Prescription Refill     Medication Name:  OXYCODONE     Pharmacy: [facility] [id number] [id number] [address] - P [phone] - F [phone]     Patient Contact Number:  [phone]

## 2024-11-25 ENCOUNTER — OFFICE VISIT (OUTPATIENT)
Dept: ORTHOPEDIC SURGERY | Age: 64
End: 2024-11-25

## 2024-11-25 DIAGNOSIS — Z96.642 HISTORY OF TOTAL HIP ARTHROPLASTY, LEFT: Primary | ICD-10-CM

## 2024-11-25 DIAGNOSIS — Z96.659 PAIN IN KNEE REGION AFTER TOTAL KNEE REPLACEMENT, INITIAL ENCOUNTER (HCC): ICD-10-CM

## 2024-11-25 DIAGNOSIS — T84.84XA PAIN IN KNEE REGION AFTER TOTAL KNEE REPLACEMENT, INITIAL ENCOUNTER (HCC): ICD-10-CM

## 2024-11-25 PROCEDURE — 99024 POSTOP FOLLOW-UP VISIT: CPT | Performed by: ORTHOPAEDIC SURGERY

## 2024-11-25 RX ORDER — MELOXICAM 15 MG/1
15 TABLET ORAL DAILY
Qty: 14 TABLET | Refills: 0 | Status: SHIPPED | OUTPATIENT
Start: 2024-11-25 | End: 2024-11-25

## 2024-11-25 RX ORDER — TRAMADOL HYDROCHLORIDE 50 MG/1
50 TABLET ORAL EVERY 8 HOURS PRN
Qty: 21 TABLET | Refills: 0 | Status: SHIPPED | OUTPATIENT
Start: 2024-11-25 | End: 2024-12-02

## 2024-11-25 RX ORDER — MELOXICAM 15 MG/1
15 TABLET ORAL DAILY
Qty: 14 TABLET | Refills: 0 | Status: SHIPPED | OUTPATIENT
Start: 2024-11-25

## 2024-11-25 RX ORDER — TRAMADOL HYDROCHLORIDE 50 MG/1
50 TABLET ORAL EVERY 8 HOURS PRN
Qty: 21 TABLET | Refills: 0 | Status: SHIPPED | OUTPATIENT
Start: 2024-11-25 | End: 2024-11-25

## 2024-11-25 NOTE — PROGRESS NOTES
Patient: Isis Malone                  : 1960   MRN: [id number]   Date of Visit: 24     Physician: Abner La MD.     Reason for Visit: Status post DARREN     Subjective History of Present Illness:     Isis Malone is here for regularly scheduled follow-up s/p LEFT DARREN. Reports they are doing well, occasional aches and pains are controlled with over the counter medications. Taking opioid medications sparingly and continuing to wean. They do not report fevers, chills or drainage from the incision site    Physical Examination??: ?   General: Patient is alert and oriented x 3 and appears comfortable.   Incision is well-approximated, no erythema, fluctuance   Fires quad, SILT to thigh     Radiographs: AP pelvis/AP/lateral hip views of operative hip with well-positioned total hip arthroplasty. No evidence of fracture subluxation or dislocation.        Assessment and Plan?: The patient is progressing well approximately 6 weeks s/p DARREN     1. A thorough discussion was had with the patient concerning the ?postoperative course and the patient is in agreement with the plan.   2. They will continue to wean from pain medications and progress weight bearing    3. Discussed the need for antibiotics prior to dental procedures at least for 1 years after arthroplasty  4. Will see the patient in 6 months  with repeat xrays at that time.

## 2025-04-07 DIAGNOSIS — Z96.651 S/P TOTAL KNEE ARTHROPLASTY, RIGHT: ICD-10-CM

## 2025-04-07 PROBLEM — F33.1 MAJOR DEPRESSIVE DISORDER, RECURRENT, MODERATE (HCC): Status: RESOLVED | Noted: 2024-01-22 | Resolved: 2025-04-07

## 2025-04-14 ENCOUNTER — HOSPITAL ENCOUNTER (OUTPATIENT)
Dept: WOMENS IMAGING | Age: 65
Discharge: HOME | End: 2025-04-14
Payer: MEDICARE

## 2025-04-14 VITALS — WEIGHT: 127 LBS | HEIGHT: 64 IN | BODY MASS INDEX: 21.68 KG/M2

## 2025-04-14 DIAGNOSIS — Z12.31 VISIT FOR SCREENING MAMMOGRAM: ICD-10-CM

## 2025-04-14 PROCEDURE — 77063 BREAST TOMOSYNTHESIS BI: CPT

## 2025-04-14 RX ORDER — AMOXICILLIN 500 MG/1
CAPSULE ORAL
Qty: 4 CAPSULE | Refills: 0 | Status: SHIPPED | OUTPATIENT
Start: 2025-04-14

## 2025-06-06 ENCOUNTER — OFFICE VISIT (OUTPATIENT)
Dept: ORTHOPEDIC SURGERY | Age: 65
End: 2025-06-06

## 2025-06-06 VITALS — HEIGHT: 64 IN | BODY MASS INDEX: 21.68 KG/M2 | WEIGHT: 127 LBS

## 2025-06-06 DIAGNOSIS — Z96.642 HISTORY OF ARTHROPLASTY OF LEFT HIP: Primary | ICD-10-CM

## (undated) DEVICE — ZIMMER® STERILE DISPOSABLE TOURNIQUET CUFF WITH PLC, DUAL PORT, SINGLE BLADDER, 30 IN. (76 CM)

## (undated) DEVICE — SUTURE VICRYL SZ 0 L36IN ABSRB UD CT-1 L36MM 1/2 CIR TAPR PNT VCP946H

## (undated) DEVICE — SCREW BNE L25MM DIA2.5MM KNEE FULL THRD HEX FEM PERSONA

## (undated) DEVICE — 2108 SERIES SAGITTAL BLADE FAN, OFFSET  (29.0 X 0.89 X 73.0MM)

## (undated) DEVICE — PADDING UNDERCAST W4INXL4YD 100% COT CRIMPED FINISH WBRL II

## (undated) DEVICE — YANKAUER,OPEN TIP,W/O VENT,STERILE: Brand: MEDLINE INDUSTRIES, INC.

## (undated) DEVICE — FAIRFIELD KNEE LF

## (undated) DEVICE — Device: Brand: STABLECUT®

## (undated) DEVICE — STERILE TOTAL KNEE DRAPE PACK: Brand: CARDINAL HEALTH

## (undated) DEVICE — SUTURE VCRL + SZ 1 L36IN ABSRB UD L36MM CT-1 1/2 CIR VCP947H

## (undated) DEVICE — SUTURE ABSORBABLE MONOFILAMENT 1 MO-4 36 CM 36 MM VIO PDS +

## (undated) DEVICE — BIPOLAR SEALER 23-112-1 AQM 6.0: Brand: AQUAMANTYS ®

## (undated) DEVICE — SUTURE STRATAFIX SZ 1 L14IN ABSRB VLT L36MM MO-4 TAPERPOINT SXPD2B400

## (undated) DEVICE — SUTURE MCRYL + SZ 3-0 L27IN ABSRB UD PS1 L24MM 3/8 CIR REV MCP936H

## (undated) DEVICE — SUTURE VCRL + SZ 2-0 L36IN ABSRB UD L36MM CT-1 1/2 CIR VCP945H

## (undated) DEVICE — BLANKET WRM W29.9XL79.1IN UP BODY FORC AIR MISTRAL-AIR

## (undated) DEVICE — GLOVE ORANGE PI 8   MSG9080

## (undated) DEVICE — SUTURE MONOCRYL + SZ 3-0 L27IN ABSRB UD PS1 L24MM 3/8 CIR REV MCP936H

## (undated) DEVICE — HYPODERMIC SAFETY NEEDLE: Brand: MAGELLAN

## (undated) DEVICE — DRAPE,U/ SHT,SPLIT,PLAS,STERIL: Brand: MEDLINE

## (undated) DEVICE — GLOVE SURG SZ 8.5 L11.85IN FNGR THK9.8MIL STRW LTX POLYMER

## (undated) DEVICE — HANDPIECE SET WITH HIGH FLOW TIP AND SUCTION TUBE: Brand: INTERPULSE

## (undated) DEVICE — ADHESIVE SKIN CLOSURE WND 8.661X1.5 IN 22 CM LIQUIBAND SECUR

## (undated) DEVICE — BANDAGE COMPR W6INXL10YD ST M E WHITE/BEIGE

## (undated) DEVICE — BLADE CLIPPER GEN PURP NS

## (undated) DEVICE — SYRINGE MED 30ML STD CLR PLAS LUERLOCK TIP N CTRL DISP

## (undated) DEVICE — DRESSING WND 4X8 IN ANTIMICROBIAL CONTACT SYS THERABOND 3D

## (undated) DEVICE — STERILE POLYISOPRENE POWDER-FREE SURGICAL GLOVES WITH EMOLLIENT COATING: Brand: PROTEXIS

## (undated) DEVICE — 6617 IOBAN II PATIENT ISOLATION DRAPE 5/BX,4BX/CS: Brand: STERI-DRAPE™ IOBAN™ 2

## (undated) DEVICE — TOTAL BASIC PK

## (undated) DEVICE — SPONGE LAP W18XL18IN WHT COT 4 PLY FLD STRUNG RADPQ DISP ST 2 PER PACK

## (undated) DEVICE — STRYKER PERFORMANCE SERIES SAGITTAL BLADE: Brand: STRYKER PERFORMANCE SERIES

## (undated) DEVICE — DECANTER FLD 9IN ST BG FOR ASEP TRNSF OF FLD

## (undated) DEVICE — SHEET,DRAPE,53X77,STERILE: Brand: MEDLINE

## (undated) DEVICE — COVER,MAYO STAND,XL,STERILE: Brand: MEDLINE

## (undated) DEVICE — SUTURE VICRYL + SZ 2-0 L36IN ABSRB UD L36MM CT-1 1/2 CIR VCP945H

## (undated) DEVICE — ORIF & ANTERIOR HIP: Brand: MEDLINE INDUSTRIES, INC.

## (undated) DEVICE — BOWL MED L 32OZ PLAS W/ MOLD GRAD EZ OPN PEEL PCH

## (undated) DEVICE — HOOD: Brand: T7PLUS

## (undated) DEVICE — PEN: MARKING STD 100/CS: Brand: MEDICAL ACTION INDUSTRIES

## (undated) DEVICE — COVER LT HNDL BLU PLAS

## (undated) DEVICE — SUTURE VICRYL + SZ 1 L36IN ABSRB UD L36MM CT-1 1/2 CIR VCP947H

## (undated) DEVICE — CIRCUIT VENTILATOR 2 LIMB AD 72 IN 22 MM CONVENTIONAL N HEAT

## (undated) DEVICE — HOOD: Brand: FLYTE

## (undated) DEVICE — APPLICATOR MEDICATED 26 CC SOLUTION HI LT ORNG CHLORAPREP

## (undated) DEVICE — GAUZE,SPONGE,4"X4",8PLY,STRL,LF,10/TRAY: Brand: MEDLINE

## (undated) DEVICE — HOOD WITH PEEL AWAY FACE SHIELD: Brand: T7PLUS

## (undated) DEVICE — HOOD, PEEL-AWAY: Brand: FLYTE

## (undated) DEVICE — SOLUTION IRRIG 2000ML 0.9% SOD CHL USP UROMATIC PLAS CONT

## (undated) DEVICE — DRESSING CNTCT 4X12IN IS THERABOND 3D